# Patient Record
Sex: FEMALE | Race: WHITE | Employment: OTHER | RURAL
[De-identification: names, ages, dates, MRNs, and addresses within clinical notes are randomized per-mention and may not be internally consistent; named-entity substitution may affect disease eponyms.]

---

## 2017-02-02 RX ORDER — EXEMESTANE 25 MG/1
TABLET ORAL
Qty: 30 TAB | Refills: 0 | Status: SHIPPED | OUTPATIENT
Start: 2017-02-02 | End: 2017-03-01 | Stop reason: SDUPTHER

## 2017-02-14 ENCOUNTER — TELEPHONE (OUTPATIENT)
Dept: FAMILY MEDICINE CLINIC | Age: 62
End: 2017-02-14

## 2017-02-14 ENCOUNTER — OFFICE VISIT (OUTPATIENT)
Dept: FAMILY MEDICINE CLINIC | Age: 62
End: 2017-02-14

## 2017-02-14 VITALS
HEIGHT: 66 IN | RESPIRATION RATE: 20 BRPM | OXYGEN SATURATION: 94 % | WEIGHT: 263.6 LBS | SYSTOLIC BLOOD PRESSURE: 114 MMHG | DIASTOLIC BLOOD PRESSURE: 68 MMHG | BODY MASS INDEX: 42.36 KG/M2 | TEMPERATURE: 97.2 F | HEART RATE: 86 BPM

## 2017-02-14 DIAGNOSIS — J01.10 ACUTE FRONTAL SINUSITIS, RECURRENCE NOT SPECIFIED: ICD-10-CM

## 2017-02-14 DIAGNOSIS — J40 BRONCHITIS: ICD-10-CM

## 2017-02-14 RX ORDER — DEXAMETHASONE SODIUM PHOSPHATE 4 MG/ML
10 INJECTION, SOLUTION INTRA-ARTICULAR; INTRALESIONAL; INTRAMUSCULAR; INTRAVENOUS; SOFT TISSUE ONCE
Qty: 2.5 ML | Refills: 0
Start: 2017-02-14 | End: 2017-02-14

## 2017-02-14 RX ORDER — BENZONATATE 200 MG/1
200 CAPSULE ORAL
Qty: 21 CAP | Refills: 3 | Status: SHIPPED | OUTPATIENT
Start: 2017-02-14 | End: 2017-02-21

## 2017-02-14 RX ORDER — CEFTRIAXONE 1 G/1
1 INJECTION, POWDER, FOR SOLUTION INTRAMUSCULAR; INTRAVENOUS ONCE
Qty: 1 VIAL | Refills: 0
Start: 2017-02-14 | End: 2017-02-14

## 2017-02-14 NOTE — MR AVS SNAPSHOT
Visit Information Date & Time Provider Department Dept. Phone Encounter #  
 2/14/2017  1:30 PM Robyn Rosales NP [place] [id number] [facility] [phone] [id number] Your Appointments 3/7/2017  1:00 PM  
Follow Up with Roland Ventura MD  
[facility] [address]) Appt Note: FOLLOWUP  
 [address] [place] [address] [id number] [phone]  
  
   
 [address] [place] [address] Upcoming Health Maintenance Date Due  
 FOOT EXAM Q1 2/12/1965 EYE EXAM RETINAL OR DILATED Q1 2/12/1965 Pneumococcal 19-64 Highest Risk (1 of 3 - PCV13) 2/12/1974 PAP AKA CERVICAL CYTOLOGY 2/12/1976 FOBT Q 1 YEAR AGE 50-75 2/12/2005 ZOSTER VACCINE AGE 60> 2/12/2015 LIPID PANEL Q1 9/24/2016 HEMOGLOBIN A1C Q6M 5/21/2017 MICROALBUMIN Q1 11/21/2017 BREAST CANCER SCRN MAMMOGRAM 4/20/2018 DTaP/Tdap/Td series (2 - Td) 11/21/2026 Allergies as of 2/14/2017  Review Complete On: 2/14/2017 By: Robyn Rosales NP Severity Noted Reaction Type Reactions Ace Inhibitors  06/03/2013    Cough Sulfa (Sulfonamide Antibiotics)  06/04/2013    Rash Current Immunizations  Reviewed on 11/7/2016 Name Date Influenza Vaccine 11/7/2016 Tdap 11/21/2016 Not reviewed this visit You Were Diagnosed With   
  
 Codes Comments Cold    -  Primary ICD-10-CM: [place] ICD-9-CM: 997 Acute frontal sinusitis, recurrence not specified     ICD-10-CM: J01.10 ICD-9-CM: 939.4 Bronchitis     ICD-10-CM: J40 ICD-9-CM: 779 Vitals BP Pulse Temp Resp Height(growth percentile) 114/68 (BP 1 Location: Left arm, BP Patient Position: Sitting) 86 97.2 °F (36.2 °C) (Temporal) 20 5' 6\" (1.676 m) Weight(growth percentile) SpO2 BMI OB Status Smoking Status 263 lb 9.6 oz (119.6 kg) 94% 42.55 kg/m2 Postmenopausal Current Some Day Smoker Vitals History BMI and BSA Data Body Mass Index Body Surface Area 42.55 kg/m 2 2.36 m 2 Preferred Pharmacy Pharmacy Name Phone [address] [phone] Your Updated Medication List  
  
   
This list is accurate as of: 2/14/17  2:52 PM.  Always use your most recent med list.  
  
  
  
  
 atorvastatin 40 mg tablet Commonly known as:  LIPITOR Take 1 Tab by mouth daily. benzonatate 200 mg capsule Commonly known as:  TESSALON Take 1 Cap by mouth three (3) times daily as needed for Cough for up to 7 days. cefTRIAXone 1 gram injection Commonly known as:  ROCEPHIN  
1 g by IntraMUSCular route once for 1 dose. denosumab 60 mg/mL injection Commonly known as:  Hank Armani 60 mg by SubCUTAneous route. dexamethasone 4 mg/mL injection Commonly known as:  DECADRON  
2.5 mL by IntraMUSCular route once for 1 dose. exemestane 25 mg tablet Commonly known as:  AROMASIN  
TAKE ONE TABLET BY MOUTH IN THE MORNING  
  
 FLUoxetine 20 mg capsule Commonly known as:  PROzac TAKE ONE CAPSULE BY MOUTH ONCE DAILY  
  
 glimepiride 4 mg tablet Commonly known as:  AMARYL  
TAKE ONE TABLET BY MOUTH EVERY MORNING  
  
 metFORMIN 1,000 mg tablet Commonly known as:  GLUCOPHAGE  
TAKE ONE TABLET BY MOUTH TWICE DAILY WITH MEALS  
  
 raNITIdine 150 mg tablet Commonly known as:  ZANTAC Take 150 mg by mouth daily. traMADol 50 mg tablet Commonly known as:  ULTRAM  
TAKE ONE TABLET BY MOUTH EVERY 8 HOURS AS NEEDED FOR PAIN **MAX  DAILY  AMOUNT  150  MG**  
  
  
  
  
Prescriptions Sent to Pharmacy Refills  
 benzonatate (TESSALON) 200 mg capsule 3 Sig: Take 1 Cap by mouth three (3) times daily as needed for Cough for up to 7 days. Class: Normal  
 Pharmacy: [address] [place] Ph #: [phone] Route: Oral  
  
We Performed the Following CEFTRIAXONE SODIUM INJECTION  MG [ HCP] Comments:  
 Mix per protocol DEXAMETHASONE SODIUM PHOSPHATE INJECTION 1 MG [ HCP] ID THER/PROPH/DIAG INJECTION, SUBCUT/IM [id number] CPT(R)] ID THER/PROPH/DIAG INJECTION, SUBCUT/IM [id number] CPT(R)] Introducing [facility]! Dear Pati Navarrete: 
Thank you for requesting a Chatwala account. Our records indicate that you have previously registered for a Chatwala account but its currently inactive. Please call our Chatwala support line at [phone]. Additional Information If you have questions, please visit the Frequently Asked Questions section of the Chatwala website at [email or web address] Remember, Chatwala is NOT to be used for urgent needs. For medical emergencies, dial 911. Now available from your iPhone and Android! Please provide this summary of care documentation to your next provider. Your primary care clinician is listed as Joann Smith. If you have any questions after today's visit, please call [phone].

## 2017-02-15 NOTE — PROGRESS NOTES
Subjective:     Savanna Lyons is a 58 y.o. female who presents today with the following:  Chief Complaint   Patient presents with    Cold Symptoms     cough   Savanna Lyons ill x 4 days with headache, nasal congestion cough and rib pain from coughing. Tried steaming, nyquil and Dayquil and using a netti pot. Followed by Dr. Mable Posadas for breast cancer. ROS:  Gen: denies fever, chills, fatigue, weight loss, weight gain  HEENT:denies blurry vision, positive nasal congestion, sore throat  Resp: denies dypsnea, positive cough, wheezing especially at night. CV: denies chest pain radiating to the jaws or arms, palpitations, lower extremity edema  Abd: denies nausea, vomiting, diarrhea, constipation  Neuro: denies numbness/tingling  Endo: denies polyuria, polydipsia, heat/cold intolerance  Heme: no lymphadenopathy    Allergies   Allergen Reactions    Ace Inhibitors Cough    Sulfa (Sulfonamide Antibiotics) Rash         Current Outpatient Prescriptions:     cefTRIAXone (ROCEPHIN) 1 gram injection, 1 g by IntraMUSCular route once for 1 dose., Disp: 1 Vial, Rfl: 0    dexamethasone (DECADRON) 4 mg/mL injection, 2.5 mL by IntraMUSCular route once for 1 dose., Disp: 2.5 mL, Rfl: 0    benzonatate (TESSALON) 200 mg capsule, Take 1 Cap by mouth three (3) times daily as needed for Cough for up to 7 days. , Disp: 21 Cap, Rfl: 3    metFORMIN (GLUCOPHAGE) 1,000 mg tablet, TAKE ONE TABLET BY MOUTH TWICE DAILY WITH MEALS, Disp: 180 Tab, Rfl: 0    exemestane (AROMASIN) 25 mg tablet, TAKE ONE TABLET BY MOUTH IN THE MORNING, Disp: 30 Tab, Rfl: 0    FLUoxetine (PROZAC) 20 mg capsule, TAKE ONE CAPSULE BY MOUTH ONCE DAILY, Disp: 90 Cap, Rfl: 0    glimepiride (AMARYL) 4 mg tablet, TAKE ONE TABLET BY MOUTH EVERY MORNING, Disp: 90 Tab, Rfl: 1    traMADol (ULTRAM) 50 mg tablet, TAKE ONE TABLET BY MOUTH EVERY 8 HOURS AS NEEDED FOR PAIN **MAX  DAILY  AMOUNT  150  MG**, Disp: 50 Tab, Rfl: 2    atorvastatin (LIPITOR) 40 mg tablet, Take 1 Tab by mouth daily. , Disp: 90 Tab, Rfl: 1    denosumab (PROLIA) 60 mg/mL injection, 60 mg by SubCUTAneous route., Disp: , Rfl:     ranitidine (ZANTAC) 150 mg tablet, Take 150 mg by mouth daily. , Disp: , Rfl:     Past Medical History   Diagnosis Date    Anxiety state, unspecified 6/4/2013    Malignant neoplasm of breast (female), unspecified site 6/4/2013    Obesity, unspecified 6/4/2013    Other and unspecified hyperlipidemia 6/4/2013    Type II or unspecified type diabetes mellitus without mention of complication, uncontrolled 6/4/2013       Past Surgical History   Procedure Laterality Date    Hx breast lumpectomy  Feb 2013    Hx breast lumpectomy  March 2013     Revision    Pr chest surgery procedure unlisted  April 2013     Portacath       History   Smoking Status    Current Some Day Smoker    Packs/day: 0.50   Smokeless Tobacco    Not on file       Social History     Social History    Marital status:      Spouse name: N/A    Number of children: N/A    Years of education: N/A     Social History Main Topics    Smoking status: Current Some Day Smoker     Packs/day: 0.50    Smokeless tobacco: None    Alcohol use Yes      Comment: Occasional    Drug use: None    Sexual activity: Not Asked     Other Topics Concern     Service No    Blood Transfusions No    Caffeine Concern No    Occupational Exposure Yes     Works with [employer]     Hobby Hazards Yes    Sleep Concern No    Stress Concern No    Special Diet No     Diabetic    Back Care Yes    Exercise Yes    Seat Belt Yes    Self-Exams Yes     Social History Narrative       Family History   Problem Relation Age of Onset    Heart Failure Mother     Diabetes Father     Heart Disease Father     Lung Disease Maternal Grandmother      TB    Lung Disease Maternal Grandfather      TB    Cancer Sister      Breast    Cancer Other      Breast         Objective:     Visit Vitals    /68 (BP 1 Location: Left arm, BP Patient Position: Sitting)    Pulse 86    Temp 97.2 °F (36.2 °C) (Temporal)    Resp 20    Ht 5' 6\" (1.676 m)    Wt 263 lb 9.6 oz (119.6 kg)    SpO2 94%    BMI 42.55 kg/m2     Body mass index is 42.55 kg/(m^2). General: Alert and oriented. Ill appearing. Well nourished  HEENT : normal cephalic ,atraumatic, frontal tendeness to palpation  Ears:TMs are normal. Canals are clear. Eyes: pupils equal, round, react to light and accommodation. .   Nose:  Boggy and pale mucosa. .   Mouth clear throat erythematous  Neck:supple full range of motion no thyromegaly. Trachea midline, No carotid bruits. No significant lymphadenopathy  Lungs[de-identified] mild expiratory wheezing, clears with cough. no rales, or rhonchi. Heart :RRR, S1 & S2 are normal intensity. No murmur; no gallop  Abdomen: bowel sounds active. No tenderness, guarding, rebound, masses, hepatic or spleen enlargement  Back: no CVA tenderness. Extremities: without clubbing, cyanosis, or edema  Pulses: radial and femoral pulses are normal  Neuro: HMF intact. Cranial nerves II through XII grossly normal.  Motor: is 5 over 5 and symmetrical.   Deep tendon reflexes: +2 equal    Results for orders placed or performed in visit on 11/21/16   HEMOGLOBIN A1C WITH EAG   Result Value Ref Range    Hemoglobin A1c 7.6 (H) 4.8 - 5.6 %    Estimated average glucose 171 mg/dL   CBC WITH AUTOMATED DIFF   Result Value Ref Range    WBC 10.7 3.4 - 10.8 x10E3/uL    RBC 4.57 3.77 - 5.28 x10E6/uL    HGB 12.9 11.1 - 15.9 g/dL    HCT 38.9 34.0 - 46.6 %    MCV 85 79 - 97 fL    MCH 28.2 26.6 - 33.0 pg    MCHC 33.2 31.5 - 35.7 g/dL    RDW 13.8 12.3 - 15.4 %    PLATELET 581 089 - 088 x10E3/uL    NEUTROPHILS 60 %    Lymphocytes 31 %    MONOCYTES 7 %    EOSINOPHILS 2 %    BASOPHILS 0 %    ABS. NEUTROPHILS 6.4 1.4 - 7.0 x10E3/uL    Abs Lymphocytes 3.3 (H) 0.7 - 3.1 x10E3/uL    ABS. MONOCYTES 0.7 0.1 - 0.9 x10E3/uL    ABS. EOSINOPHILS 0.2 0.0 - 0.4 x10E3/uL    ABS.  BASOPHILS 0.0 0.0 - 0.2 x10E3/uL    IMMATURE GRANULOCYTES 0 %    ABS. IMM. GRANS. 0.0 0.0 - 0.1 x10E3/uL   MICROALBUMIN, UR, RAND W/ MICROALBUMIN/CREA RATIO   Result Value Ref Range    Creatinine, urine 92.4 Not Estab. mg/dL    Microalbumin, urine 11.3 Not Estab. ug/mL    Microalb/Creat ratio (ug/mg creat.) 12.2 0.0 - 30.0 mg/g creat   HEPATITIS C AB   Result Value Ref Range    Hep C Virus Ab <0.1 0.0 - 0.9 s/co ratio   MEASLES/MUMPS/RUBELLA IMMUNITY   Result Value Ref Range    Rubella Ab, IgG 23.20 Immune >0.99 index    Rubeola Ab, IgG >300.0 Immune >29.9 AU/mL    Mumps Abs, IgG >300.0 Immune >10.9 AU/mL       No results found for this visit on 02/14/17. Assessment/ Plan:     Marlyn was seen today for cold symptoms. Diagnoses and all orders for this visit:    Cold    Acute frontal sinusitis, recurrence not specified    Bronchitis  -     CEFTRIAXONE SODIUM INJECTION  MG (Qty 4 for 1 gm)  -     THER/PROPH/DIAG INJECTION, SUBCUT/IM  -     DEXAMETHASONE SODIUM PHOSPHATE INJECTION 1 MG (-IBM 10)  -     THER/PROPH/DIAG INJ, SC/IM (74302)    Other orders  -     cefTRIAXone (ROCEPHIN) 1 gram injection; 1 g by IntraMUSCular route once for 1 dose. -     dexamethasone (DECADRON) 4 mg/mL injection; 2.5 mL by IntraMUSCular route once for 1 dose. -     benzonatate (TESSALON) 200 mg capsule; Take 1 Cap by mouth three (3) times daily as needed for Cough for up to 7 days. 1. Cold    2. Acute frontal sinusitis, recurrence not specified    3. Bronchitis        Orders Placed This Encounter    CEFTRIAXONE SODIUM INJECTION  MG (Qty 4 for 1 gm)     Mix per protocol     Order Specific Question:   Charge Quantity?      Answer:   4     Order Specific Question:   Dose     Answer:   1 gram     Order Specific Question:   Site     Answer:   LEFT GLUTEUS     Order Specific Question:   Expiration Date     Answer:   8/31/2019     Order Specific Question:   Lot#     Answer:   [id number]     Order Specific Question:        Answer: [employer]     Order Specific Question:   Perfomed by/Witnessed by: Answer:   djcateter lpn     Order Specific Question:   NDC#     Answer:   7351-6655-00    THER/PROPH/DIAG INJECTION, SUBCUT/IM    DEXAMETHASONE SODIUM PHOSPHATE INJECTION 1 MG (-XWN 10)     Order Specific Question:   Charge Quantity? Answer:   10     Order Specific Question:   Dose     Answer:   4mg/1 ml     Order Specific Question:   Site     Answer:   LEFT GLUTEUS     Order Specific Question:   Expiration Date     Answer:   3/31/2018     Order Specific Question:   Lot#     Answer:   [id number]     Order Specific Question:        Answer:   [employer]     Order Specific Question:   Perfomed by/Witnessed by: Answer:   djforrester lpn     Order Specific Question:   NDC#     Answer:   [address]-11    THER/PROPH/DIAG INJ, SC/IM (82483)    cefTRIAXone (ROCEPHIN) 1 gram injection     Si g by IntraMUSCular route once for 1 dose. Dispense:  1 Vial     Refill:  0    dexamethasone (DECADRON) 4 mg/mL injection     Si.5 mL by IntraMUSCular route once for 1 dose. Dispense:  2.5 mL     Refill:  0    benzonatate (TESSALON) 200 mg capsule     Sig: Take 1 Cap by mouth three (3) times daily as needed for Cough for up to 7 days. Dispense:  21 Cap     Refill:  3         Verbal and written instructions (see AVS) provided.  Patient expresses understanding of diagnosis and treatment plan.     MAYURI Faith

## 2017-02-16 ENCOUNTER — DOCUMENTATION ONLY (OUTPATIENT)
Dept: FAMILY MEDICINE CLINIC | Age: 62
End: 2017-02-16

## 2017-03-01 RX ORDER — EXEMESTANE 25 MG/1
TABLET ORAL
Qty: 30 TAB | Refills: 0 | Status: SHIPPED | OUTPATIENT
Start: 2017-03-01 | End: 2017-03-07 | Stop reason: SDUPTHER

## 2017-03-03 RX ORDER — TRAMADOL HYDROCHLORIDE 50 MG/1
TABLET ORAL
Qty: 50 TAB | Refills: 2 | Status: SHIPPED | OUTPATIENT
Start: 2017-03-03 | End: 2017-06-07 | Stop reason: SDUPTHER

## 2017-03-06 ENCOUNTER — TELEPHONE (OUTPATIENT)
Dept: FAMILY MEDICINE CLINIC | Age: 62
End: 2017-03-06

## 2017-03-06 RX ORDER — ASPIRIN 81 MG/1
81 TABLET ORAL DAILY
Qty: 30 TAB | Refills: 3 | Status: SHIPPED | OUTPATIENT
Start: 2017-03-06 | End: 2019-06-25 | Stop reason: SDUPTHER

## 2017-03-07 ENCOUNTER — OFFICE VISIT (OUTPATIENT)
Dept: ONCOLOGY | Age: 62
End: 2017-03-07

## 2017-03-07 ENCOUNTER — TELEPHONE (OUTPATIENT)
Dept: ONCOLOGY | Age: 62
End: 2017-03-07

## 2017-03-07 VITALS — HEIGHT: 66 IN

## 2017-03-07 DIAGNOSIS — M85.80 OSTEOPENIA: ICD-10-CM

## 2017-03-07 DIAGNOSIS — M25.532 WRIST PAIN, LEFT: ICD-10-CM

## 2017-03-07 DIAGNOSIS — C50.911 MALIGNANT NEOPLASM OF RIGHT FEMALE BREAST, UNSPECIFIED SITE OF BREAST: Primary | ICD-10-CM

## 2017-03-07 DIAGNOSIS — Z79.811 USE OF EXEMESTANE (AROMASIN): ICD-10-CM

## 2017-03-07 RX ORDER — EXEMESTANE 25 MG/1
25 TABLET ORAL DAILY
Qty: 90 TAB | Refills: 3 | Status: SHIPPED | OUTPATIENT
Start: 2017-03-07 | End: 2017-03-29 | Stop reason: SDUPTHER

## 2017-03-07 NOTE — TELEPHONE ENCOUNTER
LM, appointment for wrist with Dr. Mariano Gore at [facility] on 3/21/2017 at 10:50, be there at10:30 to fill out new patient paperwork, please make sure you take your insurance care and ID.

## 2017-03-07 NOTE — PROGRESS NOTES
Daisyleroy Humphrey here for follow up for breast cancer. States she if fine. Had some bronchitis but is being treated for that by EMA Beckwith NP. Ms. Wendy Almaguer has a reminder for a \"due or due soon\" health maintenance. I have asked that she contact her primary care provider for follow-up on this health maintenance.

## 2017-03-08 ENCOUNTER — DOCUMENTATION ONLY (OUTPATIENT)
Dept: FAMILY MEDICINE CLINIC | Age: 62
End: 2017-03-08

## 2017-03-08 NOTE — PROGRESS NOTES
Subjective: The patient is a 40-year-old  female with a history of stage IIA carcinoma of the right breast, being seen for followup. Her last visit here was on 11/27/16.     History of Present Illness: The patient underwent a lumpectomy and sentinel lymph node biopsy in March 2013. The tumor proved to be a stage 2, N0, M0 invasive ductal carcinoma which is poorly differentiated. It was ER positive, RI positive, HER2/carmen negative. Oncotype score was 22 with a risk of recurrence of recurrence of 14%. The patient received 6 cycles of Cytoxan and Taxotere between April and August 2013 followed by radiotherapy to the right breast between September and October 2013. She has done well. She has been on letrozole since November 2013 aiming for 5 years.     The patient does complain of some hot flushes and occasional knee pain but does not have generalized arthralgias.     The patient had a mammogram in February 2015 which was negative. Bone density done on October 31, 2013 showed osteopenia. It will be repeated in October of this year. She has been on the letrozole since October 2013 coming up to 2 years in October of this year aiming for about 5.     On 2/22/16 CBC - white count 8.8, hemoglobin 12.9, hematocrit 39.1, platelets 490,488, with 59 segs, 28 lymphs an 8 monos. CMP today is normal except for blood sugar of 111. The creatinine is 0.61, calcium 9.2, alk phos 98, bilirubin 0.17, albumin is 3.4. CA 27.29 from 10/19/15 was normal at less than 3.5. CA 27-29 from 2/22/16 was 22.     The patient had a bone density study 10/14/15, which showed osteopenia. It is basically unchanged from a study done in October, 2013. Her last mammogram was in April, 2016 and it was negative.     She is currently receiving Prolia every six months.  She is on Letrozole 2.5 mg daily, which was begun February, 2013, aiming for a total of five years.     The patient's last mammogram was on 04/20/16 and this was negative.      Her thyroid function was checked and the TSH was 0.97, which was normal, and the free T4 was low.     Letrozole has been reported as causing fatigue. The Letrozole was switched to Exemestane at the last visit and she is tolerating this quite well. It will be continued through February, 2018 and possibly for 10 years. CA 27-29 today is pending. The blood sugar is 142, creatinine 0.8, calcium 9.2, albumin 3.6, white count 7.7, hemoglobin 12.8, hematocrit 39.7, platelet count is 716,096, 52 segs, 30 lymphs and 13 monocytes. The patient has had pain in her left wrist.  This has been going on for about two months. She is wearing a splint, which she purchased at [employer]. She has not seen a physician concerning this. The possibility of a fracture or of carpal tunnel syndrome should be considered.     Past Medical History: Type 2 diabetes mellitus, elevated lipids, obesity and anxiety.     Past Surgical History: Breast lumpectomy, insertion of Mediport.     Medications: Pravachol, Glucophage, Prozac, Prinivil, Ultram, Exemestane.     Allergies: ACE inhibitors and sulfonamides causing a rash.     Transfusions: None.     Personal/Social History: Does not smoke cigarettes, drinks alcohol occasionally.     Family Medical History: The patient has an aunt with carcinoma of the lung and mother had lung cancer.     Review of Systems: Weight has been stable, no fever or night sweats, denies headache, seizures, eye or ear changes, chronic sputum production, hemoptysis, chest pain, PND, angina. No nausea, vomiting, diarrhea, melena, hematochezia, constipation, change in bowel habits, hematemesis, hematuria, nocturia, dysuria, urinary frequency, urinary hesitance, musculoskeletal aching, temperature preferences, polyuria, polydipsia or easy bruisability. No bleeding, no allergies, no psychiatric issues, no depression.  Chronic fatigue, which is improved off the Letrozole as stated above.     Physical Examination:   GENERAL: Patient was a well developed, well nourished white female in no acute distress. VITALS: Weight 260 pounds, temperature 97.2 degrees, /68, pulse 88 and regular, respiratory rate 20 and regular. HEENT: Head is normocephalic, atraumatic. Pupils equal, round, reactive to light and accommodation. Extraocular muscles were intact. Fundi not visualized. Conjunctivae normal and sclerae nonicteric. Ears, nose and throat were normal. Tongue is papillated. NECK: Supple, thyroid normal, no bruits heard over the carotid arteries. No jugular venous distention demonstrated. Trachea was midline. NODES: No cervical, supraclavicular, axillary or inguinal adenopathy noted today. CHEST: Clear. No rales, rhonchi or wheezes. BS adequate at the bases. HEART: Regular rhythm. No murmurs, rubs, thrills or gallops. BREASTS: On the left there are on masses. Some induration on the surgical site on the right, but no masses are felt within the right breast.  ABDOMEN: Soft. No liver edge, spleen tip, masses or ascites. BS are normal.  RECTAL: Deferred. PELVIC: Deferred. SKIN: Warm and dry. No petechiae. No purpura. EXTREMITIES: Show no edema. There is tenderness over the left wrist.  NEURO: Intact. Plantars are downgoing. Cranial nerves intact. CBLL function is normal.    Laboratory:  Check CBC, CMP, CA 27-29.     Impression:  1. Stage IIA carcinoma of the right breast. S/P 6 cycles of adjuvant Taxotere and Cytoxan chemotherapy plus XRT. 2. Toleratoing Exemestane well. 3. Chronic fatigue secondary to Letrozole. 4. Pain left wrist, etiology to be determined.     Plan:  1. Will obtain xrays of the left wrist and refer the patient to orthopedics. 2. Four month return. 3. 3D mammogram on 04/20/17. 4. Prolia 60 mg sub q. 05/08/17. 5. Her Exemestane five year cory will be February, 2018 and will then decide on how long to continue the treatment. CC:     Frances Marshall M.D.

## 2017-03-29 RX ORDER — EXEMESTANE 25 MG/1
TABLET ORAL
Qty: 30 TAB | Refills: 0 | Status: SHIPPED | OUTPATIENT
Start: 2017-03-29 | End: 2017-05-07 | Stop reason: SDUPTHER

## 2017-05-08 DIAGNOSIS — E78.2 MIXED HYPERLIPIDEMIA: ICD-10-CM

## 2017-05-08 RX ORDER — EXEMESTANE 25 MG/1
TABLET ORAL
Qty: 30 TAB | Refills: 0 | Status: SHIPPED | OUTPATIENT
Start: 2017-05-08 | End: 2017-06-07 | Stop reason: SDUPTHER

## 2017-05-09 RX ORDER — METFORMIN HYDROCHLORIDE 1000 MG/1
TABLET ORAL
Qty: 180 TAB | Refills: 0 | Status: SHIPPED | OUTPATIENT
Start: 2017-05-09 | End: 2017-08-03 | Stop reason: SDUPTHER

## 2017-05-09 RX ORDER — ATORVASTATIN CALCIUM 40 MG/1
40 TABLET, FILM COATED ORAL DAILY
Qty: 90 TAB | Refills: 1 | Status: SHIPPED | OUTPATIENT
Start: 2017-05-09 | End: 2017-12-19 | Stop reason: SDUPTHER

## 2017-06-08 RX ORDER — EXEMESTANE 25 MG/1
TABLET ORAL
Qty: 30 TAB | Refills: 0 | Status: SHIPPED | OUTPATIENT
Start: 2017-06-08 | End: 2017-07-06 | Stop reason: SDUPTHER

## 2017-06-08 RX ORDER — TRAMADOL HYDROCHLORIDE 50 MG/1
TABLET ORAL
Qty: 50 TAB | Refills: 0 | Status: SHIPPED | OUTPATIENT
Start: 2017-06-08 | End: 2017-12-19 | Stop reason: SDUPTHER

## 2017-06-27 DIAGNOSIS — E11.9 TYPE 2 DIABETES MELLITUS WITHOUT COMPLICATION, WITHOUT LONG-TERM CURRENT USE OF INSULIN (HCC): ICD-10-CM

## 2017-06-27 RX ORDER — GLIMEPIRIDE 4 MG/1
TABLET ORAL
Qty: 90 TAB | Refills: 1 | Status: SHIPPED | OUTPATIENT
Start: 2017-06-27 | End: 2018-01-14 | Stop reason: SDUPTHER

## 2017-07-05 DIAGNOSIS — C50.919 MALIGNANT NEOPLASM OF FEMALE BREAST, UNSPECIFIED LATERALITY, UNSPECIFIED SITE OF BREAST: Primary | ICD-10-CM

## 2017-07-06 ENCOUNTER — OFFICE VISIT (OUTPATIENT)
Dept: ONCOLOGY | Age: 62
End: 2017-07-06

## 2017-07-06 VITALS
WEIGHT: 118.4 LBS | DIASTOLIC BLOOD PRESSURE: 67 MMHG | BODY MASS INDEX: 19.11 KG/M2 | TEMPERATURE: 98.1 F | RESPIRATION RATE: 17 BRPM | OXYGEN SATURATION: 96 % | SYSTOLIC BLOOD PRESSURE: 153 MMHG | HEART RATE: 96 BPM

## 2017-07-06 DIAGNOSIS — M85.88 OSTEOPENIA OF SPINE: ICD-10-CM

## 2017-07-06 DIAGNOSIS — Z79.811 USE OF EXEMESTANE (AROMASIN): ICD-10-CM

## 2017-07-06 DIAGNOSIS — M81.0 POST-MENOPAUSAL OSTEOPOROSIS: ICD-10-CM

## 2017-07-06 DIAGNOSIS — C50.911 MALIGNANT NEOPLASM OF RIGHT FEMALE BREAST, UNSPECIFIED SITE OF BREAST: Primary | ICD-10-CM

## 2017-07-06 RX ORDER — EXEMESTANE 25 MG/1
25 TABLET ORAL DAILY
Qty: 90 TAB | Refills: 3 | Status: SHIPPED | OUTPATIENT
Start: 2017-07-06 | End: 2018-07-09 | Stop reason: SDUPTHER

## 2017-07-06 NOTE — MR AVS SNAPSHOT
Visit Information Date & Time Provider Department Dept. Phone Encounter #  
 7/6/2017  2:00 PM Donnie Baires MD [facility] [phone] Follow-up Instructions Return in about 4 months (around 11/16/2017) for office visit and evaluation. Upcoming Health Maintenance Date Due  
 FOOT EXAM Q1 2/12/1965 EYE EXAM RETINAL OR DILATED Q1 2/12/1965 Pneumococcal 19-64 Highest Risk (1 of 3 - PCV13) 2/12/1974 PAP AKA CERVICAL CYTOLOGY 2/12/1976 FOBT Q 1 YEAR AGE 50-75 2/12/2005 ZOSTER VACCINE AGE 60> 2/12/2015 LIPID PANEL Q1 9/24/2016 HEMOGLOBIN A1C Q6M 5/21/2017 INFLUENZA AGE 9 TO ADULT 8/1/2017 MICROALBUMIN Q1 11/21/2017 BREAST CANCER SCRN MAMMOGRAM 4/20/2018 DTaP/Tdap/Td series (2 - Td) 11/21/2026 Allergies as of 7/6/2017  Review Complete On: 7/6/2017 By: Donnie Baires MD  
  
 Severity Noted Reaction Type Reactions Ace Inhibitors  06/03/2013    Cough Sulfa (Sulfonamide Antibiotics)  06/04/2013    Rash Current Immunizations  Reviewed on 3/7/2017 Name Date Influenza Vaccine 11/7/2016 Tdap 11/21/2016 Not reviewed this visit You Were Diagnosed With   
  
 Codes Comments Malignant neoplasm of right female breast, unspecified site of breast ([facility] [address]    -  Primary ICD-10-CM: C50.911 ICD-9-CM: 174.9 Use of exemestane (Aromasin)     ICD-10-CM: H73.399 ICD-9-CM: V07.52 Osteopenia of spine     ICD-10-CM: M85.88 ICD-9-CM: 733.90 Post-menopausal osteoporosis     ICD-10-CM: M81.0 ICD-9-CM: 733.01 Vitals BP Pulse Temp Resp Weight(growth percentile) SpO2  
 153/67 (BP 1 Location: Left arm, BP Patient Position: Sitting) 96 98.1 °F (36.7 °C) 17 118 lb 6.4 oz (53.7 kg) 96% BMI OB Status Smoking Status 19.11 kg/m2 Postmenopausal Current Some Day Smoker BMI and BSA Data  Body Mass Index Body Surface Area  
 19.11 kg/m 2 1.58 m 2  
  
 Preferred Pharmacy Pharmacy Name Phone [facility] [address], [place] - [address] [phone] Your Updated Medication List  
  
   
This list is accurate as of: 7/6/17  2:58 PM.  Always use your most recent med list.  
  
  
  
  
 aspirin delayed-release 81 mg tablet Take 1 Tab by mouth daily. Indications: myocardial infarction prevention  
  
 atorvastatin 40 mg tablet Commonly known as:  LIPITOR Take 1 Tab by mouth daily. denosumab 60 mg/mL injection Commonly known as:  Gerri Hides 60 mg by SubCUTAneous route. exemestane 25 mg tablet Commonly known as:  Jackalyn  Take 1 Tab by mouth daily. FLUoxetine 20 mg capsule Commonly known as:  PROzac TAKE ONE CAPSULE BY MOUTH ONCE DAILY  
  
 glimepiride 4 mg tablet Commonly known as:  AMARYL  
TAKE ONE TABLET BY MOUTH EVERY MORNING  
  
 metFORMIN 1,000 mg tablet Commonly known as:  GLUCOPHAGE  
TAKE ONE TABLET BY MOUTH TWICE DAILY WITH MEALS  
  
 raNITIdine 150 mg tablet Commonly known as:  ZANTAC Take 150 mg by mouth daily. traMADol 50 mg tablet Commonly known as:  ULTRAM  
TAKE ONE TABLET BY MOUTH EVERY 8 HOURS AS NEEDED FOR PAIN **MAX  DAILY  AMOUNT  150  MG**  
  
  
  
  
Prescriptions Sent to Pharmacy Refills  
 exemestane (AROMASIN) 25 mg tablet 3 Sig: Take 1 Tab by mouth daily. Class: Normal  
 [facility] [address] [address] Ph #: [phone] Route: Oral  
  
Follow-up Instructions Return in about 4 months (around 11/16/2017) for office visit and evaluation. To-Do List   
 07/10/2017 Imaging:  TATE 3D AMANDA W MAMMO BI DX INCL CAD   
  
 07/21/2017 Imaging:  DEXA BONE DENSITY STUDY AXIAL   
  
 11/08/2017 2:45 PM  
  [facility] ([phone])  
  
 11/10/2017 2:45 PM  
  Appointment with Angelo Navarro 2 at [facility] ([phone]) [facility] SERVICES! Dear Sherlyn Sosa: 
Thank you for requesting a [employer] account. Our records indicate that you have previously registered for a [employer] account but its currently inactive. Please call our X[employer] support line at [phone]. Additional Information If you have questions, please visit the Frequently Asked Questions section of the [employer] website at [email or web address] Remember, [employer] is NOT to be used for urgent needs. For medical emergencies, dial 911. Now available from your iPhone and Android! Please provide this summary of care documentation to your next provider. Your primary care clinician is listed as Yarely Cerna. If you have any questions after today's visit, please call [phone].

## 2017-07-09 NOTE — PROGRESS NOTES
Subjective: The patient is a 58-year-old  female with a history of stage IIA carcinoma of the right breast, being seen for followup. Her last visit to this office was on 03/07/17.      History of Present Illness: The patient underwent a lumpectomy and sentinel lymph node biopsy in March 2013. The tumor proved to be a stage 2, N0, M0 invasive ductal carcinoma which is poorly differentiated. It was ER positive, ME positive, HER2/carmen negative. Oncotype score was 22 with a risk of recurrence of recurrence of 14%. The patient received 6 cycles of Cytoxan and Taxotere between April and August 2013 followed by radiotherapy to the right breast between September and October 2013. She has done well. She has been on letrozole since November 2013 aiming for 5 years.  Adalberto  The patient does complain of some hot flushes and occasional knee pain but does not have generalized arthralgias.      The patient had a mammogram in February 2015 which was negative. Bone density done on October 31, 2013 showed osteopenia. It will be repeated in October of this year. She has been on the letrozole since October 2013 coming up to 2 years in October of this year aiming for about 5.      On 2/22/16 CBC - white count 8.8, hemoglobin 12.9, hematocrit 39.1, platelets 139,258, with 59 segs, 28 lymphs an 8 monos. CMP today is normal except for blood sugar of 111. The creatinine is 0.61, calcium 9.2, alk phos 98, bilirubin 0.17, albumin is 3.4. CA 27.29 from 10/19/15 was normal at less than 3.5. CA 27-29 from 2/22/16 was 22.      The patient had a bone density study 10/14/15, which showed osteopenia. It is basically unchanged from a study done in October, 2013. Her last mammogram was in April, 2016 and it was negative.      She is currently receiving Prolia every six months.  She was taking Letrozole 2.5 mg daily, which had been begun February, 2013, however the Letrozole was switched to Exemestane in the early part of 2017 and the patient is tolerating this quite well. The plan is to continue it through February of 2018 and possibly for 10 years. Today the CA 27-29 is 8.6, the LDH is 122. CBC - WBC 9.5, HGB 12.6, HCT 38.6, plat 398k, 67 segs, 25 lymphs and 6 monos. The CMP is normal except for BS of 250, cr 0.8, calcium 9.6, globulin 4.1.      The patient's last mammogram was on 04/20/16 and this was negative.         Past Medical History: Type 2 diabetes mellitus, elevated lipids, obesity and anxiety.      Past Surgical History: Breast lumpectomy, insertion of Mediport.      Medications: Pravachol, Glucophage, Prozac, Prinivil, Ultram, Exemestane.      Allergies: ACE inhibitors and sulfonamides causing a rash.      Transfusions: None.      Personal/Social History: Does not smoke cigarettes, drinks alcohol occasionally.      Family Medical History: The patient has an aunt with carcinoma of the lung and mother had lung cancer.      Review of Systems: Weight has been stable, no fever or night sweats, denies headache, seizures, eye or ear changes, chronic sputum production, hemoptysis, chest pain, PND, angina. No nausea, vomiting, diarrhea, melena, hematochezia, constipation, change in bowel habits, hematemesis, hematuria, nocturia, dysuria, urinary frequency, urinary hesitance, musculoskeletal aching, temperature preferences, polyuria, polydipsia or easy bruisability. No bleeding, no allergies, no psychiatric issues, no depression. Chronic fatigue, which is improved off the Letrozole as stated above.      Physical Examination:   GENERAL: Patient was a well developed, well nourished white female in no acute distress. VITALS: /67, P 96 and regular, T 98.1 degrees, R 17 and regular,  lb. HEENT: Head is normocephalic, atraumatic. Pupils equal, round, reactive to light and accommodation. Extraocular muscles were intact. Fundi not visualized. Conjunctivae normal and sclerae nonicteric.  Ears, nose and throat were normal. Tongue is papillated. NECK: Supple, thyroid normal, no bruits heard over the carotid arteries. No jugular venous distention demonstrated. Trachea was midline. NODES: No cervical, supraclavicular, axillary or inguinal adenopathy noted today. CHEST: Clear. No rales, rhonchi or wheezes are heard today. BS adequate at both lung bases. HEART: Regular rhythm. No murmurs, rubs, thrills or gallops are heard today. BREASTS: On the left there are on masses. Some induration on the surgical site on the right, but no masses are noted within the right breast.  ABDOMEN: Soft. No liver edge, spleen tip, masses or ascites. BS are normal.  No tenderness elicited. RECTAL: Deferred. PELVIC: Deferred. SKIN: Warm and dry. Good turgor. No petechiae. No purpura. EXTREMITIES: Show no edema. NEURO: Intact. Plantars are downgoing. Cranial nerves intact. CBLL function is normal.     Laboratory:  See above.      Impression:  1. Stage IIA carcinoma of the right breast. S/P 6 cycles of adjuvant Taxotere and Cytoxan chemotherapy plus XRT. 2. Toleratoing Exemestane well. 3. Chronic fatigue secondary to Letrozole.      Plan:  1. Continue Exemestane 25 mg daily for five or possibly ten years. 2. A 3D mammogram has been scheduled for July of this year. 3. A DEXA scan has been scheduled for July of this year. 4. The DEXA scan that was done in October of 2015 showed osteopenia and the patient continues on Prolia.     CC:     Linda Wheeler NP

## 2017-07-18 ENCOUNTER — DOCUMENTATION ONLY (OUTPATIENT)
Dept: ONCOLOGY | Age: 62
End: 2017-07-18

## 2017-07-18 NOTE — PROGRESS NOTES
Prior Janette Sanchez is not needed for DEXA. Ref number is [id number]. Mary Beth Bell is aware of this.

## 2017-07-25 ENCOUNTER — HOSPITAL ENCOUNTER (OUTPATIENT)
Dept: MAMMOGRAPHY | Age: 62
Discharge: HOME | End: 2017-07-25
Attending: INTERNAL MEDICINE
Payer: MEDICAID

## 2017-07-25 DIAGNOSIS — C50.911 MALIGNANT NEOPLASM OF RIGHT FEMALE BREAST, UNSPECIFIED SITE OF BREAST: ICD-10-CM

## 2017-07-25 DIAGNOSIS — M81.0 POST-MENOPAUSAL OSTEOPOROSIS: ICD-10-CM

## 2017-07-25 DIAGNOSIS — M85.88 OSTEOPENIA OF SPINE: ICD-10-CM

## 2017-07-25 DIAGNOSIS — Z79.811 USE OF EXEMESTANE (AROMASIN): ICD-10-CM

## 2017-07-25 PROCEDURE — 77062 BREAST TOMOSYNTHESIS BI: CPT

## 2017-08-03 RX ORDER — METFORMIN HYDROCHLORIDE 1000 MG/1
TABLET ORAL
Qty: 180 TAB | Refills: 0 | Status: SHIPPED | OUTPATIENT
Start: 2017-08-03 | End: 2017-11-12 | Stop reason: SDUPTHER

## 2017-10-25 RX ORDER — TRAMADOL HYDROCHLORIDE 50 MG/1
TABLET ORAL
Qty: 50 TAB | Refills: 0 | OUTPATIENT
Start: 2017-10-25

## 2017-11-13 RX ORDER — METFORMIN HYDROCHLORIDE 1000 MG/1
TABLET ORAL
Qty: 180 TAB | Refills: 0 | Status: SHIPPED | OUTPATIENT
Start: 2017-11-13 | End: 2017-12-16 | Stop reason: SDUPTHER

## 2017-11-13 RX ORDER — FLUOXETINE HYDROCHLORIDE 20 MG/1
CAPSULE ORAL
Qty: 90 CAP | Refills: 0 | Status: SHIPPED | OUTPATIENT
Start: 2017-11-13 | End: 2017-12-16 | Stop reason: SDUPTHER

## 2017-11-30 ENCOUNTER — TELEPHONE (OUTPATIENT)
Dept: ONCOLOGY | Age: 62
End: 2017-11-30

## 2017-11-30 NOTE — TELEPHONE ENCOUNTER
HIPAA verified by two patient identifiers. Spoke with  Myra Osman and gave him the results that all bones in lumbar spine and hips are stronger, test showed.

## 2017-12-16 RX ORDER — METFORMIN HYDROCHLORIDE 1000 MG/1
TABLET ORAL
Qty: 180 TAB | Refills: 0 | Status: SHIPPED | OUTPATIENT
Start: 2017-12-16 | End: 2018-06-14 | Stop reason: SDUPTHER

## 2017-12-16 RX ORDER — FLUOXETINE HYDROCHLORIDE 20 MG/1
CAPSULE ORAL
Qty: 90 CAP | Refills: 0 | Status: SHIPPED | OUTPATIENT
Start: 2017-12-16 | End: 2018-05-14 | Stop reason: SDUPTHER

## 2017-12-19 ENCOUNTER — OFFICE VISIT (OUTPATIENT)
Dept: FAMILY MEDICINE CLINIC | Age: 62
End: 2017-12-19

## 2017-12-19 VITALS
WEIGHT: 256.6 LBS | OXYGEN SATURATION: 98 % | SYSTOLIC BLOOD PRESSURE: 136 MMHG | TEMPERATURE: 97.3 F | DIASTOLIC BLOOD PRESSURE: 60 MMHG | HEIGHT: 66 IN | BODY MASS INDEX: 41.24 KG/M2 | HEART RATE: 87 BPM | RESPIRATION RATE: 20 BRPM

## 2017-12-19 DIAGNOSIS — E11.9 TYPE 2 DIABETES MELLITUS WITHOUT COMPLICATION, WITHOUT LONG-TERM CURRENT USE OF INSULIN (HCC): Primary | ICD-10-CM

## 2017-12-19 DIAGNOSIS — E78.2 MIXED HYPERLIPIDEMIA: ICD-10-CM

## 2017-12-19 PROBLEM — E66.01 OBESITY, MORBID (HCC): Status: ACTIVE | Noted: 2017-12-19

## 2017-12-19 RX ORDER — TRAMADOL HYDROCHLORIDE 50 MG/1
TABLET ORAL
Qty: 90 TAB | Refills: 0 | Status: SHIPPED | OUTPATIENT
Start: 2017-12-19 | End: 2018-03-11 | Stop reason: SDUPTHER

## 2017-12-19 RX ORDER — ATORVASTATIN CALCIUM 40 MG/1
40 TABLET, FILM COATED ORAL DAILY
Qty: 90 TAB | Refills: 1 | Status: SHIPPED | OUTPATIENT
Start: 2017-12-19 | End: 2018-06-14 | Stop reason: SDUPTHER

## 2017-12-19 NOTE — MR AVS SNAPSHOT
Visit Information Date & Time Provider Department Dept. Phone Encounter #  
 12/19/2017  3:00 PM Gomez Mott NP [address] [phone] [id number] Upcoming Health Maintenance Date Due  
 FOOT EXAM Q1 2/12/1965 EYE EXAM RETINAL OR DILATED Q1 2/12/1965 PAP AKA CERVICAL CYTOLOGY 2/12/1976 FOBT Q 1 YEAR AGE 50-75 2/12/2005 LIPID PANEL Q1 9/24/2016 HEMOGLOBIN A1C Q6M 5/21/2017 MICROALBUMIN Q1 11/21/2017 Pneumococcal 19-64 Highest Risk (2 of 3 - PCV13) 12/19/2018 DTaP/Tdap/Td series (2 - Td) 11/21/2026 Allergies as of 12/19/2017  Review Complete On: 12/19/2017 By: Gomez Mott NP Severity Noted Reaction Type Reactions Ace Inhibitors  06/03/2013    Cough Sulfa (Sulfonamide Antibiotics)  06/04/2013    Rash Current Immunizations  Reviewed on 3/7/2017 Name Date Influenza Vaccine 10/12/2017, 11/7/2016 Tdap 11/21/2016 Not reviewed this visit You Were Diagnosed With   
  
 Codes Comments Type 2 diabetes mellitus without complication, without long-term current use of insulin (HCC)    -  Primary ICD-10-CM: E11.9 ICD-9-CM: 250.00 Mixed hyperlipidemia     ICD-10-CM: E78.2 ICD-9-CM: 272.2 BMI 40.0-44.9, adult [facility])     ICD-10-CM: Z68.41 
ICD-9-CM: V85.41 Vitals BP Pulse Temp Resp Height(growth percentile) 136/60 (BP 1 Location: Left arm, BP Patient Position: Sitting) 87 97.3 °F (36.3 °C) (Temporal) 20 5' 6\" (1.676 m) Weight(growth percentile) SpO2 BMI OB Status Smoking Status 256 lb 9.6 oz (116.4 kg) 98% 41.42 kg/m2 Postmenopausal Current Some Day Smoker BMI and BSA Data Body Mass Index Body Surface Area  
 41.42 kg/m 2 2.33 m 2 Preferred Pharmacy Pharmacy Name Phone [facility] [address], [place] [address] [phone] Your Updated Medication List  
  
   
This list is accurate as of: 12/19/17  4:13 PM.  Always use your most recent med list.  
  
  
  
  
 aspirin delayed-release 81 mg tablet Take 1 Tab by mouth daily. Indications: myocardial infarction prevention  
  
 atorvastatin 40 mg tablet Commonly known as:  LIPITOR Take 1 Tab by mouth daily. denosumab 60 mg/mL injection Commonly known as:  Hank Armani 60 mg by SubCUTAneous route. exemestane 25 mg tablet Commonly known as:  Seldon Sensor Take 1 Tab by mouth daily. FLUoxetine 20 mg capsule Commonly known as:  PROzac TAKE ONE CAPSULE BY MOUTH ONCE DAILY  
  
 glimepiride 4 mg tablet Commonly known as:  AMARYL  
TAKE ONE TABLET BY MOUTH EVERY MORNING  
  
 metFORMIN 1,000 mg tablet Commonly known as:  GLUCOPHAGE  
TAKE ONE TABLET BY MOUTH TWICE DAILY WITH MEALS  
  
 raNITIdine 150 mg tablet Commonly known as:  ZANTAC Take 150 mg by mouth daily. traMADol 50 mg tablet Commonly known as:  ULTRAM  
TAKE ONE TABLET BY MOUTH EVERY 8 HOURS AS NEEDED FOR PAIN **MAX  DAILY  AMOUNT  150  MG**  
  
  
  
  
Prescriptions Printed Refills  
 traMADol (ULTRAM) 50 mg tablet 0 Sig: TAKE ONE TABLET BY MOUTH EVERY 8 HOURS AS NEEDED FOR PAIN **MAX  DAILY  AMOUNT  150  MG**  
 Class: Print Prescriptions Sent to Pharmacy Refills  
 atorvastatin (LIPITOR) 40 mg tablet 1 Sig: Take 1 Tab by mouth daily. Class: Normal  
 Pharmacy: [facility] [address] [address] Ph #: [phone] Route: Oral  
  
We Performed the Following COLLECTION VENOUS BLOOD,VENIPUNCTURE [id number] CPT(R)] HEMOGLOBIN A1C WITH EAG [55737 CPT(R)] LIPID PANEL [19913 CPT(R)] METABOLIC PANEL, COMPREHENSIVE [29650 CPT(R)] MICROALBUMIN, UR, RAND W/ MICROALBUMIN/CREA RATIO [id number] CPT(R)] To-Do List   
 05/10/2018 1:00 PM  
  Appointment with Angelo Navarro 2 at [facility] ([phone]) Introducing [facility] & [facility] SERVICES! Dear Marlyn: 
Thank you for requesting a [employer] account.   Our records indicate that you have previously registered for a [employer] account but its currently inactive. Please call our [employer] support line at [phone]. Additional Information If you have questions, please visit the Frequently Asked Questions section of the [employer] website at [email or web address] Remember, [employer] is NOT to be used for urgent needs. For medical emergencies, dial 911. Now available from your iPhone and Android! Please provide this summary of care documentation to your next provider. Your primary care clinician is listed as Licha Wilian. If you have any questions after today's visit, please call [phone].

## 2017-12-20 LAB
ALBUMIN SERPL-MCNC: 4.4 G/DL (ref 3.6–4.8)
ALBUMIN/CREAT UR: 12.5 MG/G CREAT (ref 0–30)
ALBUMIN/GLOB SERPL: 1.6 {RATIO} (ref 1.2–2.2)
ALP SERPL-CCNC: 91 IU/L (ref 39–117)
ALT SERPL-CCNC: 17 IU/L (ref 0–32)
AST SERPL-CCNC: 13 IU/L (ref 0–40)
BILIRUB SERPL-MCNC: <0.2 MG/DL (ref 0–1.2)
BUN SERPL-MCNC: 12 MG/DL (ref 8–27)
BUN/CREAT SERPL: 21 (ref 12–28)
CALCIUM SERPL-MCNC: 9.8 MG/DL (ref 8.7–10.3)
CHLORIDE SERPL-SCNC: 96 MMOL/L (ref 96–106)
CHOLEST SERPL-MCNC: 251 MG/DL (ref 100–199)
CO2 SERPL-SCNC: 24 MMOL/L (ref 18–29)
CREAT SERPL-MCNC: 0.57 MG/DL (ref 0.57–1)
CREAT UR-MCNC: 55 MG/DL
EST. AVERAGE GLUCOSE BLD GHB EST-MCNC: 169 MG/DL
GLOBULIN SER CALC-MCNC: 2.7 G/DL (ref 1.5–4.5)
GLUCOSE SERPL-MCNC: 95 MG/DL (ref 65–99)
HBA1C MFR BLD: 7.5 % (ref 4.8–5.6)
HDLC SERPL-MCNC: 46 MG/DL
LDLC SERPL CALC-MCNC: 137 MG/DL (ref 0–99)
MICROALBUMIN UR-MCNC: 6.9 UG/ML
POTASSIUM SERPL-SCNC: 4.3 MMOL/L (ref 3.5–5.2)
PROT SERPL-MCNC: 7.1 G/DL (ref 6–8.5)
SODIUM SERPL-SCNC: 140 MMOL/L (ref 134–144)
TRIGL SERPL-MCNC: 338 MG/DL (ref 0–149)
VLDLC SERPL CALC-MCNC: 68 MG/DL (ref 5–40)

## 2017-12-21 NOTE — PROGRESS NOTES
Excellent liver and kidney functions! Some improvement HGBA1C  Down to 7.5 average blood glucose 7.5  Area to work on cholesterol.    A healthy eating plan:  \" Emphasizes vegetables, fruits, whole grains, and fat-free or low-fat dairy products  \" Includes lean meats, poultry, fish, beans, eggs, and nuts  \" Limits saturated and trans fats, sodium, and added sugars  \" Controls portion sizes

## 2017-12-25 NOTE — PROGRESS NOTES
Subjective:     Gabriel Collier is a 58 y.o. female who presents today with the following:  Chief Complaint   Patient presents with    Diabetes    Depression     Teresa Fill presents for f/u for chronic disease management listed below. Increase stress due to 's cancer diagnosis. Diabetes:   Diabetes. Sugars controlled well  Hypoglycemia: none  Tolerating current treatment well  Current medications include diet  oral agent (monotherapy): glimperide    Lab Results   Component Value Date/Time    Hemoglobin A1c 7.5 12/19/2017 03:59 PM    Hemoglobin A1c 7.6 11/21/2016 03:32 PM    Hemoglobin A1c 7.8 09/24/2015 04:48 PM    Glucose 95 12/19/2017 03:59 PM    Microalb/Creat ratio (ug/mg creat.) 12.5 12/19/2017 03:59 PM    LDL, calculated 137 12/19/2017 03:59 PM    Creatinine 0.57 12/19/2017 03:59 PM     Lab Results   Component Value Date/Time    Microalb/Creat ratio (ug/mg creat.) 12.5 12/19/2017 03:59 PM       Last eye exam performed  greather than 1 year ago and was normal.    Last foot exam performed greather than 1 year ago. warm, good capillary refill    Mixed hyperlipidemia: lipid panel today. BMI:Discussed the patient's BMI with her. The BMI follow up plan is as follows:     dietary management education, guidance, and counseling  encourage exercise  monitor weight  prescribed dietary intake    An After Visit Summary was printed and given to the patient.     COMPLIANT WITH MEDICATION:         ROS:  Gen: denies fever, chills, fatigue, weight loss, weight gain  HEENT:denies blurry vision, nasal congestion, sore throat  Resp: denies dypsnea, cough, wheezing  CV: denies chest pain radiating to the jaws or arms, palpitations, lower extremity edema  Abd: denies nausea, vomiting, diarrhea, constipation  Neuro: denies numbness/tingling  Endo: denies polyuria, polydipsia, heat/cold intolerance  Heme: no lymphadenopathy    Allergies   Allergen Reactions    Ace Inhibitors Cough    Sulfa (Sulfonamide Antibiotics) Rash         Current Outpatient Prescriptions:     traMADol (ULTRAM) 50 mg tablet, TAKE ONE TABLET BY MOUTH EVERY 8 HOURS AS NEEDED FOR PAIN **MAX  DAILY  AMOUNT  150  MG**, Disp: 90 Tab, Rfl: 0    atorvastatin (LIPITOR) 40 mg tablet, Take 1 Tab by mouth daily. , Disp: 90 Tab, Rfl: 1    FLUoxetine (PROZAC) 20 mg capsule, TAKE ONE CAPSULE BY MOUTH ONCE DAILY, Disp: 90 Cap, Rfl: 0    metFORMIN (GLUCOPHAGE) 1,000 mg tablet, TAKE ONE TABLET BY MOUTH TWICE DAILY WITH MEALS, Disp: 180 Tab, Rfl: 0    exemestane (AROMASIN) 25 mg tablet, Take 1 Tab by mouth daily. , Disp: 90 Tab, Rfl: 3    glimepiride (AMARYL) 4 mg tablet, TAKE ONE TABLET BY MOUTH EVERY MORNING, Disp: 90 Tab, Rfl: 1    aspirin delayed-release 81 mg tablet, Take 1 Tab by mouth daily. Indications: myocardial infarction prevention, Disp: 30 Tab, Rfl: 3    denosumab (PROLIA) 60 mg/mL injection, 60 mg by SubCUTAneous route., Disp: , Rfl:     ranitidine (ZANTAC) 150 mg tablet, Take 150 mg by mouth daily. , Disp: , Rfl:     Past Medical History:   Diagnosis Date    Anxiety state, unspecified 6/4/2013    Malignant neoplasm of breast (female), unspecified site 6/4/2013    Obesity, unspecified 6/4/2013    Other and unspecified hyperlipidemia 6/4/2013    Radiation therapy complication     Type II or unspecified type diabetes mellitus without mention of complication, uncontrolled 6/4/2013       Past Surgical History:   Procedure Laterality Date    CHEST SURGERY PROCEDURE UNLISTED  April 2013    Portacath    HX BREAST LUMPECTOMY  Feb 2013    HX BREAST LUMPECTOMY  March 2013    Revision       History   Smoking Status    Current Some Day Smoker    Packs/day: 0.50   Smokeless Tobacco    Not on file       Social History     Social History    Marital status:      Spouse name: N/A    Number of children: N/A    Years of education: N/A     Social History Main Topics    Smoking status: Current Some Day Smoker     Packs/day: 0.50    Smokeless tobacco: Not on file    Alcohol use Yes      Comment: Occasional    Drug use: Not on file    Sexual activity: Not on file     Other Topics Concern     Service No    Blood Transfusions No    Caffeine Concern No    Occupational Exposure Yes     Works with [employer].  Hobby Hazards Yes    Sleep Concern No    Stress Concern No    Special Diet No     Diabetic    Back Care Yes    Exercise Yes    Seat Belt Yes    Self-Exams Yes     Social History Narrative       Family History   Problem Relation Age of Onset    Heart Failure Mother     Diabetes Father     Heart Disease Father     Lung Disease Maternal Grandmother      TB    Lung Disease Maternal Grandfather      TB    Cancer Sister      Breast    Cancer Other      Breast         Objective:     Visit Vitals    /60 (BP 1 Location: Left arm, BP Patient Position: Sitting)    Pulse 87    Temp 97.3 °F (36.3 °C) (Temporal)    Resp 20    Ht 5' 6\" (1.676 m)    Wt 256 lb 9.6 oz (116.4 kg)    SpO2 98%    BMI 41.42 kg/m2     Body mass index is 41.42 kg/(m^2). General: Alert and oriented. No acute distress. Well nourished  HEENT :  Ears:TMs are normal. Canals are clear. Eyes: pupils equal, round, react to light and accommodation. Extra ocular movements intact. Nose: patent. Mouth and throat is clear. Neck:supple full range of motion no thyromegaly. Trachea midline, No carotid bruits. No significant lymphadenopathy  Lungs[de-identified] clear to auscultation without wheezes, rales, or rhonchi. Heart :RRR, S1 & S2 are normal intensity. No murmur; no gallop  Abdomen: bowel sounds active. No tenderness, guarding, rebound, masses, hepatic or spleen enlargement  Back: no CVA tenderness. Extremities: without clubbing, cyanosis, or edema  Pulses: radial and femoral pulses are normal  Neuro: HMF intact.  Cranial nerves II through XII grossly normal.  Motor: is 5 over 5 and symmetrical.   Deep tendon reflexes: +2 equal    Results for orders placed or performed in visit on 65/15/59   METABOLIC PANEL, COMPREHENSIVE   Result Value Ref Range    Glucose 95 65 - 99 mg/dL    BUN 12 8 - 27 mg/dL    Creatinine 0.57 0.57 - 1.00 mg/dL    GFR est non- >59 mL/min/1.73    GFR est  >59 mL/min/1.73    BUN/Creatinine ratio 21 12 - 28    Sodium 140 134 - 144 mmol/L    Potassium 4.3 3.5 - 5.2 mmol/L    Chloride 96 96 - 106 mmol/L    CO2 24 18 - 29 mmol/L    Calcium 9.8 8.7 - 10.3 mg/dL    Protein, total 7.1 6.0 - 8.5 g/dL    Albumin 4.4 3.6 - 4.8 g/dL    GLOBULIN, TOTAL 2.7 1.5 - 4.5 g/dL    A-G Ratio 1.6 1.2 - 2.2    Bilirubin, total <0.2 0.0 - 1.2 mg/dL    Alk.  phosphatase 91 39 - 117 IU/L    AST (SGOT) 13 0 - 40 IU/L    ALT (SGPT) 17 0 - 32 IU/L   HEMOGLOBIN A1C WITH EAG   Result Value Ref Range    Hemoglobin A1c 7.5 (H) 4.8 - 5.6 %    Estimated average glucose 169 mg/dL   MICROALBUMIN, UR, RAND W/ MICROALBUMIN/CREA RATIO   Result Value Ref Range    Creatinine, urine 55.0 Not Estab. mg/dL    Microalbumin, urine 6.9 Not Estab. ug/mL    Microalb/Creat ratio (ug/mg creat.) 12.5 0.0 - 30.0 mg/g creat   LIPID PANEL   Result Value Ref Range    Cholesterol, total 251 (H) 100 - 199 mg/dL    Triglyceride 338 (H) 0 - 149 mg/dL    HDL Cholesterol 46 >39 mg/dL    VLDL, calculated 68 (H) 5 - 40 mg/dL    LDL, calculated 137 (H) 0 - 99 mg/dL       Results for orders placed or performed in visit on 42/83/20   METABOLIC PANEL, COMPREHENSIVE   Result Value Ref Range    Glucose 95 65 - 99 mg/dL    BUN 12 8 - 27 mg/dL    Creatinine 0.57 0.57 - 1.00 mg/dL    GFR est non- >59 mL/min/1.73    GFR est  >59 mL/min/1.73    BUN/Creatinine ratio 21 12 - 28    Sodium 140 134 - 144 mmol/L    Potassium 4.3 3.5 - 5.2 mmol/L    Chloride 96 96 - 106 mmol/L    CO2 24 18 - 29 mmol/L    Calcium 9.8 8.7 - 10.3 mg/dL    Protein, total 7.1 6.0 - 8.5 g/dL    Albumin 4.4 3.6 - 4.8 g/dL    GLOBULIN, TOTAL 2.7 1.5 - 4.5 g/dL    A-G Ratio 1.6 1.2 - 2.2    Bilirubin, total <0.2 0.0 - 1.2 mg/dL Alk. phosphatase 91 39 - 117 IU/L    AST (SGOT) 13 0 - 40 IU/L    ALT (SGPT) 17 0 - 32 IU/L    Narrative    Performed at:  [address]  [id number]  : Tali Hardy MD, Phone:  [phone]   HEMOGLOBIN A1C WITH EAG   Result Value Ref Range    Hemoglobin A1c 7.5 (H) 4.8 - 5.6 %    Estimated average glucose 169 mg/dL    Narrative    Performed at:  [address]  [id number]  : Tali Hardy MD, Phone:  [phone]   MICROALBUMIN, UR, RAND W/ MICROALBUMIN/CREA RATIO   Result Value Ref Range    Creatinine, urine 55.0 Not Estab. mg/dL    Microalbumin, urine 6.9 Not Estab. ug/mL    Microalb/Creat ratio (ug/mg creat.) 12.5 0.0 - 30.0 mg/g creat    Narrative    Performed at:  [address]  [id number]  : Tali Hardy MD, Phone:  [phone]   LIPID PANEL   Result Value Ref Range    Cholesterol, total 251 (H) 100 - 199 mg/dL    Triglyceride 338 (H) 0 - 149 mg/dL    HDL Cholesterol 46 >39 mg/dL    VLDL, calculated 68 (H) 5 - 40 mg/dL    LDL, calculated 137 (H) 0 - 99 mg/dL    Narrative    Performed at:  [address]  [id number]  : Tali Hardy MD, Phone:  [phone]       Assessment/ Plan:     Diagnoses and all orders for this visit:    1. Type 2 diabetes mellitus without complication, without long-term current use of insulin (HCC)  -     METABOLIC PANEL, COMPREHENSIVE  -     HEMOGLOBIN A1C WITH EAG  -     COLLECTION VENOUS BLOOD,VENIPUNCTURE  -     MICROALBUMIN, UR, RAND W/ MICROALBUMIN/CREA RATIO  -     LIPID PANEL    2. Mixed hyperlipidemia  -     atorvastatin (LIPITOR) 40 mg tablet;  Take 1 Tab by mouth daily.  -     METABOLIC PANEL, COMPREHENSIVE  -     HEMOGLOBIN A1C WITH EAG  -     COLLECTION VENOUS BLOOD,VENIPUNCTURE  -     MICROALBUMIN, UR, RAND W/ MICROALBUMIN/CREA RATIO  -     LIPID PANEL    3. BMI 40.0-44.9, adult ([facility])    Other orders  -     traMADol (ULTRAM) 50 mg tablet; TAKE ONE TABLET BY MOUTH EVERY 8 HOURS AS NEEDED FOR PAIN **MAX  DAILY  AMOUNT  150  MG**         1. Type 2 diabetes mellitus without complication, without long-term current use of insulin ([address]    2. Mixed hyperlipidemia    3. BMI 40.0-44.9, adult ([address]        Orders Placed This Encounter    COLLECTION VENOUS BLOOD,VENIPUNCTURE    METABOLIC PANEL, COMPREHENSIVE    HEMOGLOBIN A1C WITH EAG    MICROALBUMIN, UR, RAND W/ MICROALBUMIN/CREA RATIO    LIPID PANEL    traMADol (ULTRAM) 50 mg tablet     Sig: TAKE ONE TABLET BY MOUTH EVERY 8 HOURS AS NEEDED FOR PAIN **MAX  DAILY  AMOUNT  150  MG**     Dispense:  90 Tab     Refill:  0    atorvastatin (LIPITOR) 40 mg tablet     Sig: Take 1 Tab by mouth daily. Dispense:  90 Tab     Refill:  1         Verbal and written instructions (see AVS) provided.  Patient expresses understanding of diagnosis and treatment plan. Follow-up Disposition:  Return in about 6 months (around 6/19/2018).       Carroll Bryant, AARON-C

## 2018-01-14 DIAGNOSIS — E11.9 TYPE 2 DIABETES MELLITUS WITHOUT COMPLICATION, WITHOUT LONG-TERM CURRENT USE OF INSULIN (HCC): ICD-10-CM

## 2018-01-16 RX ORDER — GLIMEPIRIDE 4 MG/1
TABLET ORAL
Qty: 90 TAB | Refills: 1 | Status: SHIPPED | OUTPATIENT
Start: 2018-01-16 | End: 2018-07-13 | Stop reason: SDUPTHER

## 2018-05-09 ENCOUNTER — DOCUMENTATION ONLY (OUTPATIENT)
Dept: ONCOLOGY | Age: 63
End: 2018-05-09

## 2018-05-09 NOTE — PROGRESS NOTES
Pt is scheduled for 5/9/2018 for Prolina injection. [facility] notified. Treatment denied by [facility]. Last dose was received on 11/10/2017. Pre authorization department/ person will be following up.

## 2018-05-14 RX ORDER — FLUOXETINE HYDROCHLORIDE 20 MG/1
CAPSULE ORAL
Qty: 90 CAP | Refills: 0 | Status: SHIPPED | OUTPATIENT
Start: 2018-05-14 | End: 2018-10-29 | Stop reason: SDUPTHER

## 2018-07-09 ENCOUNTER — OFFICE VISIT (OUTPATIENT)
Dept: ONCOLOGY | Age: 63
End: 2018-07-09

## 2018-07-09 VITALS
TEMPERATURE: 98.3 F | DIASTOLIC BLOOD PRESSURE: 64 MMHG | BODY MASS INDEX: 39.73 KG/M2 | HEIGHT: 66 IN | RESPIRATION RATE: 16 BRPM | SYSTOLIC BLOOD PRESSURE: 135 MMHG | OXYGEN SATURATION: 96 % | WEIGHT: 247.2 LBS | HEART RATE: 88 BPM

## 2018-07-09 DIAGNOSIS — M85.88 OSTEOPENIA OF SPINE: ICD-10-CM

## 2018-07-09 DIAGNOSIS — Z17.0 MALIGNANT NEOPLASM OF CENTRAL PORTION OF RIGHT BREAST IN FEMALE, ESTROGEN RECEPTOR POSITIVE (HCC): Primary | ICD-10-CM

## 2018-07-09 DIAGNOSIS — C50.111 MALIGNANT NEOPLASM OF CENTRAL PORTION OF RIGHT BREAST IN FEMALE, ESTROGEN RECEPTOR POSITIVE (HCC): Primary | ICD-10-CM

## 2018-07-09 DIAGNOSIS — Z79.811 USE OF EXEMESTANE (AROMASIN): ICD-10-CM

## 2018-07-09 RX ORDER — EXEMESTANE 25 MG/1
25 TABLET ORAL DAILY
Qty: 90 TAB | Refills: 3 | Status: SHIPPED | OUTPATIENT
Start: 2018-07-09 | End: 2019-06-25 | Stop reason: SDUPTHER

## 2018-07-09 NOTE — MR AVS SNAPSHOT
[address] [id number] [phone] Patient: Reza Desai MRN: [id number] PRX:5/62/2622 Visit Information Date & Time Provider Department Dept. Phone Encounter #  
 7/9/2018  2:30 PM Shivani Kaufman MD [facility] [id number] Follow-up Instructions Return in about 4 months (around 11/9/2018) for OV. Follow-up and Disposition History Your Appointments 8/21/2018  4:00 PM  
ESTABLISHED PATIENT with Belkis Browning NP  
[address] ([address]) Appt Note: check up [address] [address]  
[address] [facility] [address] [address] Upcoming Health Maintenance Date Due  
 PAP AKA CERVICAL CYTOLOGY 2/12/1976 FOBT Q 1 YEAR AGE 50-75 2/12/2005 HEMOGLOBIN A1C Q6M 6/19/2018 Influenza Age 5 to Adult 8/1/2018 Pneumococcal 19-64 Highest Risk (2 of 3 - PCV13) 12/19/2018 FOOT EXAM Q1 12/19/2018 MICROALBUMIN Q1 12/19/2018 LIPID PANEL Q1 12/19/2018 EYE EXAM RETINAL OR DILATED Q1 3/5/2019 BREAST CANCER SCRN MAMMOGRAM 7/25/2019 DTaP/Tdap/Td series (2 - Td) 11/21/2026 Allergies as of 7/9/2018  Review Complete On: 7/9/2018 By: Shivani Kaufman MD  
  
 Severity Noted Reaction Type Reactions Ace Inhibitors  06/03/2013    Cough Sulfa (Sulfonamide Antibiotics)  06/04/2013    Rash Current Immunizations  Reviewed on 7/9/2018 Name Date Influenza Vaccine 10/12/2017, 11/7/2016 Tdap 11/21/2016 Reviewed by Jennifer Cali RN on 7/9/2018 at  3:06 PM  
You Were Diagnosed With   
  
 Codes Comments Malignant neoplasm of central portion of right breast in female, estrogen receptor positive ([facility]    -  Primary ICD-10-CM: C50.111, Z17.0 ICD-9-CM: 174.1, V86.0 Osteopenia of spine     ICD-10-CM: M85.88 ICD-9-CM: 733.90   
 Use of exemestane (Aromasin)     ICD-10-CM: A37.014 ICD-9-CM: V07.52 Vitals BP Pulse Temp Resp Height(growth percentile) Weight(growth percentile) 135/64 88 98.3 °F (36.8 °C) (Oral) 16 5' 6\" (1.676 m) 247 lb 3.2 oz (112.1 kg) SpO2 BMI OB Status Smoking Status 96% 39.9 kg/m2 Postmenopausal Current Some Day Smoker BMI and BSA Data Body Mass Index Body Surface Area  
 39.9 kg/m 2 2.28 m 2 Preferred Pharmacy Pharmacy Name Phone [address], [address]ty Mu[address]oz [phone] Your Updated Medication List  
  
   
This list is accurate as of 7/9/18  3:50 PM.  Always use your most recent med list.  
  
  
  
  
 aspirin delayed-release 81 mg tablet Take 1 Tab by mouth daily. Indications: myocardial infarction prevention  
  
 atorvastatin 40 mg tablet Commonly known as:  LIPITOR  
TAKE ONE TABLET BY MOUTH ONCE DAILY CENTRUM SILVER WOMEN PO Take  by mouth. denosumab 60 mg/mL injection Commonly known as:  Trygve Solders 60 mg by SubCUTAneous route. exemestane 25 mg tablet Commonly known as:  Metta Dun Take 1 Tab by mouth daily. FLUoxetine 20 mg capsule Commonly known as:  PROzac TAKE ONE CAPSULE BY MOUTH ONCE DAILY  
  
 glimepiride 4 mg tablet Commonly known as:  AMARYL  
TAKE ONE TABLET BY MOUTH IN THE MORNING  
  
 metFORMIN 1,000 mg tablet Commonly known as:  GLUCOPHAGE  
TAKE ONE TABLET BY MOUTH TWICE DAILY WITH  MEALS  
  
 raNITIdine 150 mg tablet Commonly known as:  ZANTAC Take 150 mg by mouth daily. traMADol 50 mg tablet Commonly known as:  ULTRAM  
TAKE ONE TABLET BY MOUTH EVERY 8 HOURS AS NEEDED FOR PAIN ***MAXIMUM  DAILY  AMOUNT  150MG***  
  
  
  
  
Prescriptions Sent to Pharmacy Refills  
 exemestane (AROMASIN) 25 mg tablet 3 Sig: Take 1 Tab by mouth daily.   
 Class: Normal  
 Pharmacy: [facility] [address] [facility] [address]  #: [phone] Route: Oral  
  
Follow-up Instructions Return in about 4 months (around 11/9/2018) for OV. To-Do List   
 07/24/2018 Imaging:  TATE MAMMOGRAM DIAG BILAT SAME DAY INCL CAD Introducing [facility]! [employer] introduces [employer] patient portal. Now you can access parts of your medical record, email your doctor's office, and request medication refills online. 1. In your internet browser, go to [email or web address] 2. Click on the First Time User? Click Here link in the Sign In box. You will see the New Member Sign Up page. 3. Enter your [employer] Access Code exactly as it appears below. You will not need to use this code after youve completed the sign-up process. If you do not sign up before the expiration date, you must request a new code. · [employer] Access Code: [email or web address] Expires: 10/7/2018  3:17 PM 
 
4. Enter the last four digits of your Social Security Number (xxxx) and Date of Birth (mm/dd/yyyy) as indicated and click Submit. You will be taken to the next sign-up page. 5. Create a [employer] ID. This will be your [employer] login ID and cannot be changed, so think of one that is secure and easy to remember. 6. Create a [employer] password. You can change your password at any time. 7. Enter your Password Reset Question and Answer. This can be used at a later time if you forget your password. 8. Enter your e-mail address. You will receive e-mail notification when new information is available in [address] 9. Click Sign Up. You can now view and download portions of your medical record. 10. Click the Download Summary menu link to download a portable copy of your medical information. If you have questions, please visit the Frequently Asked Questions section of the [employer] website. Remember, [employer] is NOT to be used for urgent needs. For medical emergencies, dial 911. Now available from your iPhone and Android! Please provide this summary of care documentation to your next provider. Your primary care clinician is listed as Edward Chapa. If you have any questions after today's visit, please call [phone].

## 2018-07-09 NOTE — PROGRESS NOTES
Subjective: The patient is a 77-year-old  female with a history of stage IIA carcinoma of the right breast, being seen for followup. Her last visit to this office was one year ago. [facility]  History of Present Illness: The patient underwent a lumpectomy and sentinel lymph node biopsy in 2013. The tumor proved to be a stage 2, N0, M0 invasive ductal carcinoma which is poorly differentiated. It was ER positive, FL positive, HER2/carmen negative. Oncotype score was 22 with a risk of recurrence of recurrence of 14%. The patient received 6 cycles of Cytoxan and Taxotere between April and 2013 followed by radiotherapy to the right breast between September and 2013. She has done well. She has been on letrozole since 2013 aiming for 5 years. but then was switched to exemestane and tolerated it better. She is also on chronic long term lexapro      Mammograms were one year ago. . Bone density has shown osteopenia but repeat in  showed some improvement. Her last mammogram was in .      She received one dose of prolia. . She was taking Letrozole 2.5 mg daily, which had been begun , however the Letrozole was switched to Exemestane in the early part of  and the patient is tolerating this quite well. The plan is to continue it through 2018 and possibly for 10 years. she c/o depression since her  . recently       Past Medical History: Type 2 diabetes mellitus, elevated lipids, obesity and anxiety.      Past Surgical History: Breast lumpectomy, insertion of Mediport.      Medications: Pravachol, Glucophage, Prozac, Prinivil, Ultram, Exemestane.      Allergies: ACE inhibitors and sulfonamides causing a rash.      Transfusions: None.      Personal/Social History: Does not smoke cigarettes, drinks alcohol occasionally.      Family Medical History: The patient has an aunt with carcinoma of the lung and mother had lung cancer.      Review of Systems: Weight has been stable, no fever or night sweats, denies headache, seizures, eye or ear changes, chronic sputum production, hemoptysis, chest pain, PND, angina. No nausea, vomiting, diarrhea, melena, hematochezia, constipation, change in bowel habits, hematemesis, hematuria, nocturia, dysuria, urinary frequency, urinary hesitance, musculoskeletal aching, temperature preferences, polyuria, polydipsia or easy bruisability. No bleeding, no allergies, no psychiatric issues, no depression. Chronic fatigue, which is improved off the Letrozole as stated above.      Physical Examination:   GENERAL: Patient was a well developed, well nourished white female in no acute distress.high BMI  VITALS:see chart  HEENT: Head is normocephalic, atraumatic. Pupils equal, round, reactive to light and accommodation. Extraocular muscles were intact. Fundi not visualized. Conjunctivae normal and sclerae nonicteric. Ears, nose and throat were normal. Tongue is papillated. NECK: Supple, thyroid normal, no bruits heard over the carotid arteries. No jugular venous distention demonstrated. Trachea was midline. NODES: No cervical, supraclavicular, axillary or inguinal adenopathy noted today. CHEST: Clear. No rales, rhonchi or wheezes are heard today. BS adequate at both lung bases. HEART: Regular rhythm. No murmurs, rubs, thrills or gallops are heard today. BREASTS:large pendulous breasts. On the left there are on masses. Some induration on the surgical site on the right, but no masses are noted within the right breast.  ABDOMEN: Soft. No liver edge, spleen tip, masses or ascites. BS are normal.  No tenderness elicited. RECTAL: Deferred. PELVIC: Deferred. SKIN: Warm and dry. Good turgor. No petechiae. No purpura. EXTREMITIES: Show no edema. NEURO: Intact. Plantars are downgoing. Cranial nerves intact. CBLL function is normal.     Laboratory: 11/17 bone dexa showed some improvement in the osteopenia       Impression:  1.  Stage IIA carcinoma of the right breast. ER/MN POSITIVE AND [id number] NEGATIVE WITH INTERMEDIATE ONCOTYPE SCORE. S/P 6 cycles of adjuvant Taxotere and Cytoxan chemotherapy plus XRT(brachytherapy) . no evidence for recurrence locally   2. Toleratoing Exemestane well. 3. Osteopenia ,improved              4. Depression      Plan:  1. Continue hormonal therapy for total of  7- ten years. 2. Mammograms due this month  . 3. Recommend Vit D and adequate dietary calcium,and consider redose prolia .            4.   RECOMMEND PSYCHOLOGY REFERRAL/B ereavment . counseling   CC:     Linda Robertson NP    followup every 4months.

## 2018-07-09 NOTE — PROGRESS NOTES
Marcy Rowe is a 61 y.o. female   Last Mammo was 7/25/17, she is due again this month. She lost her best  in January 2018 and her best friend 6 months before. She feels depressed. Patient interested in attending a grief counseling group or speaking with a counselor. Contacted Rev. Damian Lee at [facility], he will provide a brochure on a local group who meet for grief counseling. Rev. Jimmy Wood came over to provide brochures on local grief counseling group that meets in [place]. He offered to meet with the patient at this time. Patient declined meeting at this time. Encouraged patient to attend meeting.

## 2018-07-13 DIAGNOSIS — M15.9 PRIMARY OSTEOARTHRITIS INVOLVING MULTIPLE JOINTS: ICD-10-CM

## 2018-07-13 DIAGNOSIS — E11.9 TYPE 2 DIABETES MELLITUS WITHOUT COMPLICATION, WITHOUT LONG-TERM CURRENT USE OF INSULIN (HCC): ICD-10-CM

## 2018-07-15 RX ORDER — GLIMEPIRIDE 4 MG/1
TABLET ORAL
Qty: 90 TAB | Refills: 1 | Status: SHIPPED | OUTPATIENT
Start: 2018-07-15 | End: 2019-01-17 | Stop reason: SDUPTHER

## 2018-07-17 RX ORDER — TRAMADOL HYDROCHLORIDE 50 MG/1
TABLET ORAL
Qty: 90 TAB | Refills: 0 | Status: SHIPPED | OUTPATIENT
Start: 2018-07-17 | End: 2018-12-19

## 2018-08-21 ENCOUNTER — OFFICE VISIT (OUTPATIENT)
Dept: FAMILY MEDICINE CLINIC | Age: 63
End: 2018-08-21

## 2018-08-21 VITALS
WEIGHT: 249.8 LBS | DIASTOLIC BLOOD PRESSURE: 70 MMHG | BODY MASS INDEX: 41.62 KG/M2 | TEMPERATURE: 97.1 F | RESPIRATION RATE: 20 BRPM | HEIGHT: 65 IN | SYSTOLIC BLOOD PRESSURE: 134 MMHG | OXYGEN SATURATION: 96 % | HEART RATE: 91 BPM

## 2018-08-21 DIAGNOSIS — E78.2 MIXED HYPERLIPIDEMIA: ICD-10-CM

## 2018-08-21 DIAGNOSIS — M15.9 PRIMARY OSTEOARTHRITIS INVOLVING MULTIPLE JOINTS: ICD-10-CM

## 2018-08-21 DIAGNOSIS — E11.9 TYPE 2 DIABETES MELLITUS WITHOUT COMPLICATION, WITHOUT LONG-TERM CURRENT USE OF INSULIN (HCC): Primary | ICD-10-CM

## 2018-08-21 DIAGNOSIS — Z12.11 SCREENING FOR COLON CANCER: ICD-10-CM

## 2018-08-21 DIAGNOSIS — Z12.39 SCREENING FOR BREAST CANCER: ICD-10-CM

## 2018-08-21 DIAGNOSIS — Z85.3 HISTORY OF BREAST CANCER: ICD-10-CM

## 2018-08-21 RX ORDER — GLUCOSAMINE SULFATE 1500 MG
POWDER IN PACKET (EA) ORAL DAILY
COMMUNITY
End: 2020-08-25

## 2018-08-21 NOTE — MR AVS SNAPSHOT
[address] "[employer]" 62 
[phone] Patient: Hemant Lei MRN: [id number] MHX:[id number] Visit Information Date & Time Provider Department Dept. Phone Encounter #  
 8/21/2018  4:00 PM Rick Andrew NP [facility] [address] [phone] [id number] Upcoming Health Maintenance Date Due  
 PAP AKA CERVICAL CYTOLOGY 2/12/1976 FOBT Q 1 YEAR AGE 50-75 2/12/2005 HEMOGLOBIN A1C Q6M 6/19/2018 Influenza Age 5 to Adult 8/1/2018 Pneumococcal 19-64 Highest Risk (2 of 3 - PCV13) 12/19/2018 FOOT EXAM Q1 12/19/2018 MICROALBUMIN Q1 12/19/2018 LIPID PANEL Q1 12/19/2018 EYE EXAM RETINAL OR DILATED Q1 3/5/2019 BREAST CANCER SCRN MAMMOGRAM 7/25/2019 DTaP/Tdap/Td series (2 - Td) 11/21/2026 Allergies as of 8/21/2018  Review Complete On: 7/9/2018 By: Xena Aguiar MD  
  
 Severity Noted Reaction Type Reactions Ace Inhibitors  06/03/2013    Cough Sulfa (Sulfonamide Antibiotics)  06/04/2013    Rash Current Immunizations  Reviewed on 7/9/2018 Name Date Influenza Vaccine 10/12/2017, 11/7/2016 Tdap 11/21/2016 Not reviewed this visit You Were Diagnosed With   
  
 Codes Comments Screening for colon cancer    -  Primary ICD-10-CM: Z12.11 ICD-9-CM: V76.51 Screening for breast cancer     ICD-10-CM: Z12.31 
ICD-9-CM: V76.10 History of breast cancer     ICD-10-CM: Z85.3 ICD-9-CM: V10.3 Vitals BP Pulse Temp Resp Height(growth percentile) 134/70 (BP 1 Location: Left arm, BP Patient Position: Sitting) 91 97.1 °F (36.2 °C) (Temporal) 20 5' 4.5\" (1.638 m) Weight(growth percentile) SpO2 BMI OB Status Smoking Status 249 lb 12.8 oz (113.3 kg) 96% 42.22 kg/m2 Postmenopausal Current Some Day Smoker BMI and BSA Data Body Mass Index Body Surface Area  
 42.22 kg/m 2 2.27 m 2 Preferred Pharmacy Pharmacy Name Phone [address] [facility] [address] [phone] Your Updated Medication List  
  
   
This list is accurate as of 8/21/18  4:56 PM.  Always use your most recent med list.  
  
  
  
  
 aspirin delayed-release 81 mg tablet Take 1 Tab by mouth daily. Indications: myocardial infarction prevention  
  
 atorvastatin 40 mg tablet Commonly known as:  LIPITOR  
TAKE ONE TABLET BY MOUTH ONCE DAILY CENTRUM SILVER WOMEN PO Take  by mouth.  
  
 exemestane 25 mg tablet Commonly known as:  Wendy Mussel Take 1 Tab by mouth daily. FLUoxetine 20 mg capsule Commonly known as:  PROzac TAKE ONE CAPSULE BY MOUTH ONCE DAILY  
  
 glimepiride 4 mg tablet Commonly known as:  AMARYL  
TAKE ONE TABLET BY MOUTH IN THE MORNING  
  
 metFORMIN 1,000 mg tablet Commonly known as:  GLUCOPHAGE  
TAKE ONE TABLET BY MOUTH TWICE DAILY WITH  MEALS  
  
 raNITIdine 150 mg tablet Commonly known as:  ZANTAC Take 150 mg by mouth daily. traMADol 50 mg tablet Commonly known as:  ULTRAM  
TAKE 1 TABLET BY MOUTH EVERY 8 HOURS AS NEEDED FOR PAIN ***MAXIMUM  DAILY  AMOUNT  150MG***  
  
 VITAMIN D3 1,000 unit Cap Generic drug:  cholecalciferol Take  by mouth daily. We Performed the Following REFERRAL FOR COLONOSCOPY [WVC970 Custom] Comments:  
 Please evaluate patient for colon cancer initial. History of breast cancer 5 years . To-Do List   
 08/21/2018 Imaging:  TATE 3D AMANDA W MAMMO BI DX INCL CAD Referral Information Referral ID Referred By Referred To  
  
 [id number] Bess JACQUES MD   
   [facility] [address] Phone: [phone] Fax: [phone] Visits Status Start Date End Date 1 New Request 8/21/18 8/21/19 If your referral has a status of pending review or denied, additional information will be sent to support the outcome of this decision. Introducing hospitals & HEALTH SERVICES! [employer] introduces [employer] patient portal. Now you can access parts of your medical record, email your doctor's office, and request medication refills online. 1. In your internet browser, go to [email or web address] 2. Click on the First Time User? Click Here link in the Sign In box. You will see the New Member Sign Up page. 3. Enter your [employer] Access Code exactly as it appears below. You will not need to use this code after youve completed the sign-up process. If you do not sign up before the expiration date, you must request a new code. · [employer] Access Code: [email or web address] Expires: 10/7/2018  3:17 PM 
 
4. Enter the last four digits of your Social Security Number (xxxx) and Date of Birth (mm/dd/yyyy) as indicated and click Submit. You will be taken to the next sign-up page. 5. Create a [employer] ID. This will be your [employer] login ID and cannot be changed, so think of one that is secure and easy to remember. 6. Create a [employer] password. You can change your password at any time. 7. Enter your Password Reset Question and Answer. This can be used at a later time if you forget your password. 8. Enter your e-mail address. You will receive e-mail notification when new information is available in [address] 9. Click Sign Up. You can now view and download portions of your medical record. 10. Click the Download Summary menu link to download a portable copy of your medical information. If you have questions, please visit the Frequently Asked Questions section of the [employer] website. Remember, [employer] is NOT to be used for urgent needs. For medical emergencies, dial 911. Now available from your iPhone and Android! Please provide this summary of care documentation to your next provider. Your primary care clinician is listed as Alexei Vicente. If you have any questions after today's visit, please call [phone].

## 2018-08-26 NOTE — PROGRESS NOTES
Subjective:     Mariella Mcintosh is a 61 y.o. female who presents today with the following:  Chief Complaint   Patient presents with    Medication Management   new concern:  passed away recently. Leaving the practice due to insurance . Will provide a 30 day refill rx . Patient Active Problem List   Diagnosis Code    Type 2 diabetes mellitus without complication, without long-term current use of insulin ([facility] [address] E11.9    Malignant neoplasm of central portion of right breast in female, estrogen receptor positive ([facility] [address] C50.111, Z17.0    Mixed hyperlipidemia E78.2    Obesity, unspecified E66.9    Anxiety state, unspecified F41.1    Osteoarthritis M19.90    Use of letrozole (Femara) Z79.811    Osteopenia M85.80    Fatigue due to treatment R53.83    Use of exemestane (Aromasin) Z79.811    Wrist pain, left M25.532    Post-menopausal osteoporosis M81.0    Obesity, morbid (HCC) E66.01         COMPLIANT WITH MEDICATION:    Denies chest pain, dyspnea, palpitations, headache and blurred vision. Blood pressure normotensive. DM:   Diabetes. Sugars controlled moderately  Hypoglycemia: none  Tolerating current treatment moderately  Current medications include diet  oral agents (dual therapy): Glucophage    Lab Results   Component Value Date/Time    Hemoglobin A1c 7.5 (H) 12/19/2017 03:59 PM    Hemoglobin A1c 7.6 (H) 11/21/2016 03:32 PM    Hemoglobin A1c 7.8 (H) 09/24/2015 04:48 PM    Glucose 112 (H) 07/09/2018 04:15 PM    Microalb/Creat ratio (ug/mg creat.) 12.5 12/19/2017 03:59 PM    LDL, calculated 137 (H) 12/19/2017 03:59 PM    Creatinine 0.74 07/09/2018 04:15 PM     Lab Results   Component Value Date/Time    Microalb/Creat ratio (ug/mg creat.) 12.5 12/19/2017 03:59 PM       Last eye exam performed  less than 1 year 3/5/2018 ago and was normal with corrective lenses. .    Last foot exam 12/19/2017 performed less than 1 year ago.   warm, good capillary refill        ROS:  Gen: denies fever, chills, fatigue, weight loss, weight gain  HEENT:denies blurry vision, nasal congestion, sore throat  Resp: denies dypsnea, cough, wheezing  CV: denies chest pain radiating to the jaws or arms, palpitations, lower extremity edema  Abd: denies nausea, vomiting, diarrhea, constipation  Neuro: denies numbness/tingling  Endo: denies polyuria, polydipsia, heat/cold intolerance  Heme: no lymphadenopathy    Allergies   Allergen Reactions    Ace Inhibitors Cough    Sulfa (Sulfonamide Antibiotics) Rash           Past Medical History:   Diagnosis Date    Anxiety state, unspecified 6/4/2013    Malignant neoplasm of breast (female), unspecified site 6/4/2013    Obesity, unspecified 6/4/2013    Other and unspecified hyperlipidemia 6/4/2013    Radiation therapy complication     Type II or unspecified type diabetes mellitus without mention of complication, uncontrolled 6/4/2013       Past Surgical History:   Procedure Laterality Date    CHEST SURGERY PROCEDURE UNLISTED  April 2013    Portacath    HX BREAST LUMPECTOMY  Feb 2013    HX BREAST LUMPECTOMY  March 2013    Revision       History   Smoking Status    Current Some Day Smoker    Packs/day: 0.50    Types: Cigarettes   Smokeless Tobacco    Never Used       Social History     Social History    Marital status:      Spouse name: N/A    Number of children: N/A    Years of education: N/A     Social History Main Topics    Smoking status: Current Some Day Smoker     Packs/day: 0.50     Types: Cigarettes    Smokeless tobacco: Never Used    Alcohol use Yes      Comment: Occasional    Drug use: None    Sexual activity: Not Asked     Other Topics Concern     Service No    Blood Transfusions No    Caffeine Concern No    Occupational Exposure Yes     Works with [employer].     Hobby Hazards Yes    Sleep Concern No    Stress Concern No    Special Diet No     Diabetic    Back Care Yes    Exercise Yes    Seat Belt Yes    Self-Exams Yes     Social History Narrative       Family History   Problem Relation Age of Onset    Heart Failure Mother     Diabetes Father     Heart Disease Father     Lung Disease Maternal Grandmother      TB    Lung Disease Maternal Grandfather      TB    Cancer Sister      Breast    Cancer Other      Breast         Objective:     Visit Vitals    /70 (BP 1 Location: Left arm, BP Patient Position: Sitting)    Pulse 91    Temp 97.1 °F (36.2 °C) (Temporal)    Resp 20    Ht 5' 4.5\" (1.638 m)    Wt 249 lb 12.8 oz (113.3 kg)    SpO2 96%    BMI 42.22 kg/m2     Body mass index is 42.22 kg/(m^2). General: Alert and oriented. No acute distress. Well nourished. Tearful inconsolable at times. HEENT :  Ears:TMs are normal. Canals are clear. Eyes: pupils equal, round, react to light and accommodation. Extra ocular movements intact. Nose: patent. Mouth and throat is clear. Neck:supple full range of motion no thyromegaly. Trachea midline, No carotid bruits. No significant lymphadenopathy  Lungs[de-identified] clear to auscultation without wheezes, rales, or rhonchi. Heart :RRR, S1 & S2 are normal intensity. No murmur; no gallop  Abdomen: bowel sounds active. No tenderness, guarding, rebound, masses, hepatic or spleen enlargement  Back: no CVA tenderness. Extremities: without clubbing, cyanosis, or edema  Pulses: radial and femoral pulses are normal  Neuro: HMF intact.  Cranial nerves II through XII grossly normal.  Motor: is 5 over 5 and symmetrical.   Deep tendon reflexes: +2 equal    Results for orders placed or performed during the hospital encounter of 45/58/71   METABOLIC PANEL, COMPREHENSIVE   Result Value Ref Range    Sodium 140 136 - 145 mmol/L    Potassium 4.2 3.5 - 5.1 mmol/L    Chloride 102 97 - 108 mmol/L    CO2 31 21 - 32 mmol/L    Anion gap 7 5 - 15 mmol/L    Glucose 112 (H) 65 - 100 mg/dL    BUN 13 6 - 20 MG/DL    Creatinine 0.74 0.55 - 1.02 MG/DL    BUN/Creatinine ratio 18 12 - 20      GFR est AA >60 >60 ml/min/1.73m2    GFR est non-AA >60 >60 ml/min/1.73m2    Calcium 9.8 8.5 - 10.1 MG/DL    Bilirubin, total 0.2 0.2 - 1.0 MG/DL    ALT (SGPT) 24 12 - 78 U/L    AST (SGOT) 16 15 - 37 U/L    Alk. phosphatase 114 45 - 117 U/L    Protein, total 7.6 6.4 - 8.2 g/dL    Albumin 3.7 3.5 - 5.0 g/dL    Globulin 3.9 2.0 - 4.0 g/dL    A-G Ratio 0.9 (L) 1.1 - 2.2     CBC WITH AUTOMATED DIFF   Result Value Ref Range    WBC 9.9 3.6 - 11.0 K/uL    RBC 4.67 3.80 - 5.20 M/uL    HGB 13.0 11.5 - 16.0 g/dL    HCT 40.1 35.0 - 47.0 %    MCV 85.9 80.0 - 99.0 FL    MCH 27.8 26.0 - 34.0 PG    MCHC 32.4 30.0 - 36.5 g/dL    RDW 13.0 11.5 - 14.5 %    PLATELET 932 (H) 622 - 400 K/uL    MPV 9.0 8.9 - 12.9 FL    NRBC 0.0 0  WBC    ABSOLUTE NRBC 0.00 0.00 - 0.01 K/uL    NEUTROPHILS 62 32 - 75 %    LYMPHOCYTES 28 12 - 49 %    MONOCYTES 8 5 - 13 %    EOSINOPHILS 2 0 - 7 %    BASOPHILS 0 0 - 1 %    IMMATURE GRANULOCYTES 0 0.0 - 0.5 %    ABS. NEUTROPHILS 6.2 1.8 - 8.0 K/UL    ABS. LYMPHOCYTES 2.7 0.8 - 3.5 K/UL    ABS. MONOCYTES 0.7 0.0 - 1.0 K/UL    ABS. EOSINOPHILS 0.2 0.0 - 0.4 K/UL    ABS. BASOPHILS 0.0 0.0 - 0.1 K/UL    ABS. IMM. GRANS. 0.0 0.00 - 0.04 K/UL    DF AUTOMATED         No results found for this visit on 08/21/18. Assessment/ Plan:     1. Screening for colon cancer    - REFERRAL FOR COLONOSCOPY    2. Screening for breast cancer    - TATE 3D AMANDA W MAMMO BI DX INCL CAD; Future    3. History of breast cancer    - TATE 3D AMANDA W MAMMO BI DX INCL CAD; Future    4. Type 2 diabetes mellitus without complication, without long-term current use of insulin (HCC)  Discussed the patient's BMI with her. The BMI follow up plan is as follows:     dietary management education, guidance, and counseling  encourage exercise  monitor weight  prescribed dietary intake    Continue medical regime    5. Mixed hyperlipidemia  Discussed the patient's BMI with her.   The BMI follow up plan is as follows:     dietary management education, guidance, and counseling  encourage exercise  monitor weight  prescribed dietary intake    An After Visit Summary was printed and given to the patient. 6. Primary osteoarthritis involving multiple joints        Orders Placed This Encounter    TATE 3D AMANDA W MAMMO BI DX INCL CAD     Please schedule after 2 pm     Standing Status:   Future     Standing Expiration Date:   9/21/2019     Scheduling Instructions:      [facility]     Order Specific Question:   Reason for Exam     Answer:   hx breast cancer    REFERRAL FOR COLONOSCOPY     Referral Priority:   Routine     Referral Type:   Consultation     Referral Reason:   Specialty Services Required     Referred to Provider:   Elsy Woods MD    cholecalciferol (VITAMIN D3) 1,000 unit cap     Sig: Take  by mouth daily. Verbal and written instructions (see AVS) provided.  Patient expresses understanding of diagnosis and treatment plan. Health Maintenance Due   Topic Date Due    PAP AKA CERVICAL CYTOLOGY  02/12/1976    FOBT Q 1 YEAR AGE 50-75  02/12/2005    HEMOGLOBIN A1C Q6M  06/19/2018    Influenza Age 9 to Adult  08/01/2018         Follow-up Disposition:  Return in about 3 months (around 11/21/2018).  or as needed      MAYURI Bess

## 2018-09-21 RX ORDER — METFORMIN HYDROCHLORIDE 1000 MG/1
TABLET ORAL
Qty: 180 TAB | Refills: 0 | Status: SHIPPED | OUTPATIENT
Start: 2018-09-21 | End: 2018-12-18 | Stop reason: SDUPTHER

## 2018-11-20 ENCOUNTER — OFFICE VISIT (OUTPATIENT)
Dept: ONCOLOGY | Age: 63
End: 2018-11-20

## 2018-11-20 VITALS
DIASTOLIC BLOOD PRESSURE: 73 MMHG | RESPIRATION RATE: 20 BRPM | OXYGEN SATURATION: 95 % | BODY MASS INDEX: 40.45 KG/M2 | HEART RATE: 96 BPM | HEIGHT: 65 IN | TEMPERATURE: 98.4 F | WEIGHT: 242.8 LBS | SYSTOLIC BLOOD PRESSURE: 157 MMHG

## 2018-11-20 DIAGNOSIS — M85.80 OSTEOPENIA, UNSPECIFIED LOCATION: ICD-10-CM

## 2018-11-20 DIAGNOSIS — C50.311 MALIGNANT NEOPLASM OF LOWER-INNER QUADRANT OF RIGHT BREAST OF FEMALE, ESTROGEN RECEPTOR POSITIVE (HCC): Primary | ICD-10-CM

## 2018-11-20 DIAGNOSIS — Z17.0 MALIGNANT NEOPLASM OF LOWER-INNER QUADRANT OF RIGHT BREAST OF FEMALE, ESTROGEN RECEPTOR POSITIVE (HCC): Primary | ICD-10-CM

## 2018-11-20 NOTE — PROGRESS NOTES
Hematology Oncology Progress Note    Ana Wheeler is a 61 y.o. female with history of stage IIA, T2N0M0 PD invasive ductal carcinoma of the right breast, ER & MA positive and Her 2 negative s/p lumpectomy + SLNB in 03/2013. Her oncotype dx recurrence score was 22 with a recurrence risk of 14%. She received 6 cycles of adjuvant chemotherapy with docetaxel + cytoxan completed as of 08/2013 followed by adjuvant radiation therapy completed as of 10/2013. She was started on Letrozole since 11/2013 and was then switched to exemestane since 01/2017. She had a dexa scan done 11/2017 that showed osteopenia and has received Prolia for 3-4 years, last dose received in 11/2017. Her last screening mammogram was done 7/6/17 and is past due for a repeat now. Chart notes reviewed since last visit. Chief complaints: She complains of mild head congestion due to URTI but denies any worsening fatigue, hotflashes, arthralgias or myalgias, SOB, loss of weight or apetite. Visit Vitals  /73 (BP 1 Location: Left arm, BP Patient Position: Sitting)   Pulse 96   Temp 98.4 °F (36.9 °C) (Oral)   Resp 20   Ht 5' 4.5\" (1.638 m)   Wt 242 lb 12.8 oz (110.1 kg)   SpO2 95%   BMI 41.03 kg/m²     Review of Systems:  Constitutional No fevers, chills, night sweats, excessive fatigue or weight loss. Allergic/Immunologic No recent allergic reactions   Eyes No significant visual difficulties. No diplopia. ENMT No problems with hearing, no sore throat, no sinus drainage. Endocrine No hot flashes or night sweats. No cold intolerance, polyuria, or polydipsia   Hematologic/Lymphatic No easy bruising or bleeding. The patient denies any tender or palpable lymph nodes   Breasts No abnormal masses of breast, nipple discharge or pain. Respiratory No dyspnea on exertion, orthopnea, chest pain, cough or hemoptysis. Cardiovascular No anginal chest pain, irregular heart beat, tachycardia, palpitations or orthopnea.    Gastrointestinal No nausea, vomiting, diarrhea, constipation, cramping, dysphagia, reflux, heartburn, GI bleeding, or early satiety. No change in bowel habits. Genitourinary (M) No hematuria, dysuria, increased frequency, urgency, hesitancy or incontinence. Musculoskeletal No joint pain, swelling or redness. No decreased range of motion. Integumentary No chronic rashes, inflammation, ulcerations, pruritus, petechiae, purpura, ecchymoses, or skin changes. Neurologic No headache, blurred vision, and no areas of focal weakness or numbness. Normal gait. No sensory problems. Psychiatric No insomnia, depression, luli or mood swings. No psychotropic drugs. Physical Examination: ECOG PS 0  Breast exam: normal, no masses or axillary lymphadenopathy appreciated. Constitutional Alert, cooperative, oriented. Mood and affect appropriate. Appears close to chronological age. Well nourished. Well developed. Head Normocephalic; no scars   Eyes Conjunctivae and sclerae are clear and without icterus. Pupils are reactive and equal.   ENMT Sinuses are nontender. No oral exudates, ulcers, masses, thrush or mucositis. Oropharynx clear. Tongue normal.   Neck Supple without masses or thyromegaly. No jugular venous distension. Hematologic/Lymphatic No petechiae or purpura. No tender or palpable lymph nodes in the cervical, supraclavicular, axillary or inguinal area. Respiratory Lungs are clear to auscultation without rhonchi or wheezing. Cardiovascular Regular rate and rhythm of heart without murmurs, gallops or rubs. Chest / Line Site Chest is symmetric with no chest wall deformities. Abdomen Non-tender, non-distended, no masses, ascites or hepatosplenomegaly. Good bowel sounds. No guarding or rebound tenderness. No pulsatile masses. Musculoskeletal No tenderness or swelling, normal range of motion without obvious weakness. Extremities No visible deformities, no cyanosis, clubbing or edema.     Skin No rashes, scars, or lesions suggestive of malignancy. No petechiae, purpura, or ecchymoses. No excoriations. Neurologic No sensory or motor deficits, normal cerebellar function, normal gait, cranial nerves intact. Psychiatric Alert and oriented times three. Coherent speech. Verbalizes understanding of our discussions today. Labs: Pending    Imaging: Evita    Assessment and Plan:     1. Stage IIA invasive ductal carcinoma of right breast, ER & MO positive, Her 2 negative:    - She is s/p lumpectomy + SLND in 03/2013 followed by adjuvant chemotherapy with TC followed by adjuvant radiation completed as of 10/2013. - She was initially started on Letrozole in 11/2013 but switched to Exemestane in 03/2017 and has been tolerating it well. - She has osteopenia and has received Prolia for 3-4 years but according to the patient she missed her last dose in 05/2018because it was not approved by her insurance. - I advised her to start oral calcium 500mg twice daily and continue vitamin D 1000mg once daily.  - Blood work ordered for CBC, CMP & CA 27-29 to be done today, results pending.  - She is due for mammogram, will call and reschedule. - She will continue Exemestane 25mg once daily until 11/2020 (total 7 years). 2. Osteopenia:    - I would recommend to continue Prolia every 6 months for now. - Her last Dexa scan was in 11/2017 that showed osteopenia and patient continues to be on Exemestane until 11/2020.  - She is not due for a repeat Dexa scan until 11/2019. She will return to the clinic in 2 weeks after the screening mammogram to discuss the results following which she will continue 6 monthly follow up visits.

## 2018-11-20 NOTE — PROGRESS NOTES
Vidhya Dumont is a 61 y.o. female denies any pain. Patient has had head congestion for about 7 days.

## 2018-12-10 ENCOUNTER — OFFICE VISIT (OUTPATIENT)
Dept: ONCOLOGY | Age: 63
End: 2018-12-10

## 2018-12-10 VITALS
RESPIRATION RATE: 19 BRPM | TEMPERATURE: 97.6 F | DIASTOLIC BLOOD PRESSURE: 69 MMHG | HEART RATE: 93 BPM | HEIGHT: 65 IN | OXYGEN SATURATION: 98 % | BODY MASS INDEX: 40.65 KG/M2 | SYSTOLIC BLOOD PRESSURE: 152 MMHG | WEIGHT: 244 LBS

## 2018-12-10 DIAGNOSIS — Z17.0 MALIGNANT NEOPLASM OF LOWER-INNER QUADRANT OF RIGHT BREAST OF FEMALE, ESTROGEN RECEPTOR POSITIVE (HCC): Primary | ICD-10-CM

## 2018-12-10 DIAGNOSIS — C50.311 MALIGNANT NEOPLASM OF LOWER-INNER QUADRANT OF RIGHT BREAST OF FEMALE, ESTROGEN RECEPTOR POSITIVE (HCC): Primary | ICD-10-CM

## 2018-12-10 PROBLEM — R77.1 HYPERGLOBULINEMIA: Status: ACTIVE | Noted: 2018-12-10

## 2018-12-10 PROBLEM — D75.839 THROMBOCYTOSIS: Status: ACTIVE | Noted: 2018-12-10

## 2018-12-10 NOTE — PROGRESS NOTES
Hubert Worley is a 61 y.o. female who presents to the office today for the following:  Chief Complaint   Patient presents with    Breast Cancer       Referring Provider:  Angela Raymond NP    HPI The patient returns to go over her mammogram. On 12/1718 her mammogram was ok and there is a suggestion for annual F/U. She also had a breast exam and it was fine by Dr Steffany Simmons 1120/18. However the labs presented a difficulty with changes from the previous one. There was the suggestion of immature granulocytes but this was minimal and a slght increase in monocytes and a border ANC but more concerning is the elevated alkaline phosphatase, drop in albumin and elevation of globulins. All of these were normal in July of this year. The patient said that around that time she was suffering with a sinus infection. Last dexa scan was 11/22/17      CURRENT TREATMENT: Exemestane, prolia    Past Medical History:   Diagnosis Date    Anxiety state, unspecified 6/4/2013    Malignant neoplasm of breast (female), unspecified site 6/4/2013    Menopause     Obesity, unspecified 6/4/2013    Other and unspecified hyperlipidemia 6/4/2013    Radiation therapy complication     Type II or unspecified type diabetes mellitus without mention of complication, uncontrolled 6/4/2013     Past Surgical History:   Procedure Laterality Date    CHEST SURGERY PROCEDURE UNLISTED  April 2013    Portacath    HX BREAST BIOPSY      HX BREAST LUMPECTOMY  Feb 2013    HX BREAST LUMPECTOMY  March 2013    Revision       No flowsheet data found.]          Key Oncology Meds             exemestane (AROMASIN) 25 mg tablet  (Taking) Take 1 Tab by mouth daily.               Allergies   Allergen Reactions    Ace Inhibitors Cough    Sulfa (Sulfonamide Antibiotics) Rash       TRANSFUSIONS: None     Social History     Socioeconomic History    Marital status:      Spouse name: Not on file    Number of children: Not on file    Years of education: Not on file  Highest education level: Not on file   Tobacco Use    Smoking status: Current Some Day Smoker     Packs/day: 0.50     Types: Cigarettes    Smokeless tobacco: Never Used   Substance and Sexual Activity    Alcohol use: Yes     Comment: Occasional   Other Topics Concern     Service No    Blood Transfusions No    Caffeine Concern No    Occupational Exposure Yes     Comment: Works with [email or web address].  Hobby Hazards Yes    Sleep Concern No    Stress Concern No    Special Diet No     Comment: Diabetic    Back Care Yes    Exercise Yes    Seat Belt Yes    Self-Exams Yes   . Family History   Problem Relation Age of Onset    Heart Failure Mother     Diabetes Father     Heart Disease Father     Lung Disease Maternal Grandmother         TB    Lung Disease Maternal Grandfather         TB    Cancer Sister         Breast    Breast Cancer Sister     Cancer Other         Breast       Review of Systems   Constitutional: Negative. HENT: Negative. Eyes: Negative. Respiratory: Negative. Cardiovascular: Negative. Gastrointestinal: Negative. Genitourinary: Negative for dysuria and hematuria. Musculoskeletal: Positive for joint pain. Knee pains for years   Skin: Negative. Neurological: Negative for tingling, sensory change, seizures and loss of consciousness. Endo/Heme/Allergies: Positive for polydipsia. Does not bruise/bleed easily. Physical Exam   Constitutional: She is oriented to person, place, and time and well-developed, well-nourished, and in no distress. No distress. obese   HENT:   Head: Normocephalic and atraumatic. Mouth/Throat: Oropharynx is clear and moist. No oropharyngeal exudate. Eyes: Conjunctivae and EOM are normal. Pupils are equal, round, and reactive to light. Right eye exhibits no discharge. Left eye exhibits no discharge. No scleral icterus. Neck: No tracheal deviation present. No thyromegaly present.    Cardiovascular: Normal rate, regular rhythm and normal heart sounds. Exam reveals no gallop and no friction rub. No murmur heard. Pulmonary/Chest: No stridor. No respiratory distress. She has no wheezes. She has no rales. Abdominal: She exhibits no distension and no mass. There is no hepatosplenomegaly. There is no tenderness. There is no rebound and no guarding. Musculoskeletal: She exhibits no edema, tenderness or deformity. Lymphadenopathy:     She has no cervical adenopathy. She has no axillary adenopathy. Right: No inguinal adenopathy present. Left: No inguinal adenopathy present. Neurological: She is alert and oriented to person, place, and time. No cranial nerve deficit. Skin: Skin is warm. Psychiatric: Mood, memory and affect normal.     Visit Vitals  /69 (BP 1 Location: Left arm, BP Patient Position: Sitting)   Pulse 93   Temp 97.6 °F (36.4 °C)   Resp 19   Ht 5' 4.5\" (1.638 m)   Wt 244 lb (110.7 kg)   SpO2 98%   BMI 41.24 kg/m²         ASSESSMENT:  1. Malignant neoplasm of central portion of right breast in female, estrogen receptor positive (HCC)    2. Elevation of alkaline phosphatase. Concern for liver or bone disease (mets?). Drop in Albumin. Was this sinus infection more serious that was thought? Is the increase in globulins linked to the infection and polyclonal or is there a monoclonal gammopathy perhaps with a drop on normal Ig's related to a respiratory infection. Will check this out and see if she needs an appointment earlier. XR Results (maximum last 3): Results from Abstract encounter on 11/20/15   XR HIP RT AP/LAT MIN 2 V   Results from Abstract encounter on 02/06/14   XR MAMMOGRAM DIAG BILAT       CT Results (maximum last 3): Results from Abstract encounter on 05/22/14   CT CHEST W CONT       MRI Results (maximum last 3): No results found for this or any previous visit. Nuclear Medicine Results (maximum last 3):   Results from Abstract encounter on 02/06/14   NM BONE SCAN LTD   Results from Abstract encounter on 09/13/13   NM WHITE BLOOD CELL IMAGING   NM WHITE BLOOD CELL IMAGING       US Results (maximum last 3): Results from Abstract encounter on 10/17/14   US EXT NONVAS RT COMP   Results from Abstract encounter on 06/02/14   US ABD LTD       TATE Results (most recent):  Results from [facility] encounter on 12/07/18   TATE MAMMOGRAM DIAG BILAT SAME DAY INCL CAD    Narrative EXAM:  TATE MAMMOGRAM DIAG BILAT SAME DAY INCL CAD. INDICATION: History of right-sided breast cancer with lumpectomy performed 2013. COMPARISON: Prior mammograms 7/25/2017 through 1/22/2013. COMPOSITION: There are scattered areas of fibroglandular density. TECHNIQUE: Routine CC and MLO views of each breast and spot magnification canal  caudal and spot magnification mediolateral views of the left breast were  performed with digital technique and interpreted with use of computer-aided  detection. Additionally, tomosynthesis of both breasts in the CC and MLO  projections was performed. FINDINGS: There is a stable and benign appearance to lumpectomy change in the  inferomedial right breast. There is no mass, suspicious calcification,  distortion, skin thickening, or nipple retraction. No significant change from  the prior study. Impression IMPRESSION:  No mammographic evidence of malignancy. BI-RADS Assessment  Category 2: Benign finding. RECOMMENDATION:  Routine screening mammogram in one year in the absence of new  or suspicious clinical findings. VAS/US Results (maximum last 3): No results found for this or any previous visit. PET Results (maximum last 3): No results found for this or any previous visit.     Results for orders placed or performed during the hospital encounter of 97/68/66   METABOLIC PANEL, COMPREHENSIVE   Result Value Ref Range    Sodium 140 136 - 145 mmol/L    Potassium 3.8 3.5 - 5.1 mmol/L    Chloride 101 97 - 108 mmol/L    CO2 27 21 - 32 mmol/L    Anion gap 12 5 - 15 mmol/L    Glucose 119 (H) 65 - 100 mg/dL    BUN 18 6 - 20 MG/DL    Creatinine 0.65 0.55 - 1.02 MG/DL    BUN/Creatinine ratio 28 (H) 12 - 20      GFR est AA >60 >60 ml/min/1.73m2    GFR est non-AA >60 >60 ml/min/1.73m2    Calcium 9.5 8.5 - 10.1 MG/DL    Bilirubin, total 0.1 (L) 0.2 - 1.0 MG/DL    ALT (SGPT) 21 12 - 78 U/L    AST (SGOT) 13 (L) 15 - 37 U/L    Alk. phosphatase 120 (H) 45 - 117 U/L    Protein, total 7.8 6.4 - 8.2 g/dL    Albumin 3.1 (L) 3.5 - 5.0 g/dL    Globulin 4.7 (H) 2.0 - 4.0 g/dL    A-G Ratio 0.7 (L) 1.1 - 2.2     CBC WITH AUTOMATED DIFF   Result Value Ref Range    WBC 11.6 (H) 3.6 - 11.0 K/uL    RBC 4.42 3.80 - 5.20 M/uL    HGB 12.2 11.5 - 16.0 g/dL    HCT 38.0 35.0 - 47.0 %    MCV 86.0 80.0 - 99.0 FL    MCH 27.6 26.0 - 34.0 PG    MCHC 32.1 30.0 - 36.5 g/dL    RDW 12.5 11.5 - 14.5 %    PLATELET 539 (H) 425 - 400 K/uL    MPV 8.5 (L) 8.9 - 12.9 FL    NRBC 0.0 0  WBC    ABSOLUTE NRBC 0.00 0.00 - 0.01 K/uL    NEUTROPHILS 67 32 - 75 %    LYMPHOCYTES 21 12 - 49 %    MONOCYTES 9 5 - 13 %    EOSINOPHILS 2 0 - 7 %    BASOPHILS 0 0 - 1 %    IMMATURE GRANULOCYTES 1 (H) 0.0 - 0.5 %    ABS. NEUTROPHILS 7.8 1.8 - 8.0 K/UL    ABS. LYMPHOCYTES 2.4 0.8 - 3.5 K/UL    ABS. MONOCYTES 1.1 (H) 0.0 - 1.0 K/UL    ABS. EOSINOPHILS 0.2 0.0 - 0.4 K/UL    ABS. BASOPHILS 0.0 0.0 - 0.1 K/UL    ABS. IMM. GRANS. 0.1 (H) 0.00 - 0.04 K/UL    DF AUTOMATED     CA 27.29   Result Value Ref Range    CA 27.29 9.9 0.0 - 38.6 U/mL     No orders of the defined types were placed in this encounter. Plan (Comment): Will check repeat alkaline phosphatase and add GGT. Will get SPEP/GUDELIA to R/O Monoclonal Gammopathy and repeat CBC which looks better but low grade thrombocytosis persists. See no mutation studies but level is not high. Did not note a spleen but she is obese and it could be missed. Splenomegaly could obscure a higher platelet count. Had an ABD.  U/S in the past but I can't see the result, order in 5/14 for the liver and GB      Follow-up Disposition:  Return in about 6 months (around 6/10/2019) for Office visit, Labs.     Kemi Sanchez MD

## 2018-12-18 DIAGNOSIS — E78.2 MIXED HYPERLIPIDEMIA: ICD-10-CM

## 2018-12-18 DIAGNOSIS — M15.9 PRIMARY OSTEOARTHRITIS INVOLVING MULTIPLE JOINTS: ICD-10-CM

## 2018-12-19 RX ORDER — TRAMADOL HYDROCHLORIDE 50 MG/1
TABLET ORAL
Qty: 90 TAB | Refills: 0 | OUTPATIENT
Start: 2018-12-19

## 2018-12-19 RX ORDER — ATORVASTATIN CALCIUM 40 MG/1
TABLET, FILM COATED ORAL
Qty: 90 TAB | Refills: 1 | Status: SHIPPED | OUTPATIENT
Start: 2018-12-19 | End: 2019-06-17 | Stop reason: SDUPTHER

## 2018-12-19 RX ORDER — METFORMIN HYDROCHLORIDE 1000 MG/1
TABLET ORAL
Qty: 180 TAB | Refills: 0 | Status: SHIPPED | OUTPATIENT
Start: 2018-12-19 | End: 2019-03-19 | Stop reason: SDUPTHER

## 2019-01-17 DIAGNOSIS — E11.9 TYPE 2 DIABETES MELLITUS WITHOUT COMPLICATION, WITHOUT LONG-TERM CURRENT USE OF INSULIN (HCC): ICD-10-CM

## 2019-01-17 RX ORDER — GLIMEPIRIDE 4 MG/1
TABLET ORAL
Qty: 90 TAB | Refills: 1 | Status: SHIPPED | OUTPATIENT
Start: 2019-01-17 | End: 2019-06-25 | Stop reason: SDUPTHER

## 2019-02-13 ENCOUNTER — OFFICE VISIT (OUTPATIENT)
Dept: ONCOLOGY | Age: 64
End: 2019-02-13

## 2019-02-13 VITALS
HEIGHT: 65 IN | TEMPERATURE: 98.5 F | HEART RATE: 81 BPM | WEIGHT: 246.2 LBS | BODY MASS INDEX: 41.02 KG/M2 | RESPIRATION RATE: 17 BRPM | DIASTOLIC BLOOD PRESSURE: 67 MMHG | SYSTOLIC BLOOD PRESSURE: 149 MMHG | OXYGEN SATURATION: 84 %

## 2019-02-13 DIAGNOSIS — Z17.0 MALIGNANT NEOPLASM OF CENTRAL PORTION OF RIGHT BREAST IN FEMALE, ESTROGEN RECEPTOR POSITIVE (HCC): Primary | ICD-10-CM

## 2019-02-13 DIAGNOSIS — C50.111 MALIGNANT NEOPLASM OF CENTRAL PORTION OF RIGHT BREAST IN FEMALE, ESTROGEN RECEPTOR POSITIVE (HCC): Primary | ICD-10-CM

## 2019-02-13 NOTE — PROGRESS NOTES
Reason for Visit:  
Adriana Philip is a 59 y.o. female who is seen for follow up of breast cancer Treatment History: Dx: IDC Right breast--T2N0M0--Stage IIA--ER/DC+HER2- Tx: Lumpectomy with SLN 3/2013. Oncotype 22. Subsequent chemotherapy with TC x 6 cycles with transition to AI in 2014--was started on letrozole but is currently taking Exemestane--she is unaware of why the transition. Continues on the exemestane. Dx: Osteopenia Tx: Prolia every 6 months History of Present Illness:  
Back for routine follow up. No changes since last check. She had a work up for MGUS at last check and this was negative. Work up was based on elevated Alk Phos. The Alk Phos has been persistenly elevated. She denies any changes in her health or medications. Past Medical History:  
Diagnosis Date  Anxiety state, unspecified 6/4/2013  Malignant neoplasm of breast (female), unspecified site 6/4/2013  Menopause  Obesity, unspecified 6/4/2013  Other and unspecified hyperlipidemia 6/4/2013  Radiation therapy complication  Type II or unspecified type diabetes mellitus without mention of complication, uncontrolled 6/4/2013 Past Surgical History:  
Procedure Laterality Date  CHEST SURGERY PROCEDURE UNLISTED  April 2013 Portacath  HX BREAST BIOPSY  HX BREAST LUMPECTOMY  Feb 2013  HX BREAST LUMPECTOMY  March 2013 Revision Social History Tobacco Use  Smoking status: Current Some Day Smoker Packs/day: 0.50 Years: 50.00 Pack years: 25.00 Types: Cigarettes  Smokeless tobacco: Never Used Substance Use Topics  Alcohol use: Yes Comment: Occasional  
  
Family History Problem Relation Age of Onset  Heart Failure Mother  Diabetes Father  Heart Disease Father  Lung Disease Maternal Grandmother TB  Lung Disease Maternal Grandfather TB  Cancer Sister Breast  
 Breast Cancer Sister  Cancer Other Breast  
 
Current Outpatient Medications Medication Sig  
 glimepiride (AMARYL) 4 mg tablet TAKE 1 TABLET BY MOUTH IN THE MORNING  
 atorvastatin (LIPITOR) 40 mg tablet TAKE 1 TABLET BY MOUTH ONCE DAILY  metFORMIN (GLUCOPHAGE) 1,000 mg tablet TAKE 1 TABLET BY MOUTH TWICE DAILY WITH MEALS  
 FLUoxetine (PROZAC) 20 mg capsule TAKE 1 CAPSULE BY MOUTH ONCE DAILY  cholecalciferol (VITAMIN D3) 1,000 unit cap Take  by mouth daily.  multivit-min/iron/folic/lutein (CENTRUM SILVER WOMEN PO) Take  by mouth.  exemestane (AROMASIN) 25 mg tablet Take 1 Tab by mouth daily.  aspirin delayed-release 81 mg tablet Take 1 Tab by mouth daily. Indications: myocardial infarction prevention  ranitidine (ZANTAC) 150 mg tablet Take 150 mg by mouth daily. No current facility-administered medications for this visit. Allergies Allergen Reactions  Ace Inhibitors Cough  Sulfa (Sulfonamide Antibiotics) Rash Review of Systems: A complete review of systems was obtained, negative except as described above. Physical Exam:  
 
Visit Vitals /67 Pulse 81 Temp 98.5 °F (36.9 °C) (Oral) Resp 17 Ht 5' 4.5\" (1.638 m) Wt 246 lb 3.2 oz (111.7 kg) SpO2 (!) 84% BMI 41.61 kg/m² ECOG PS: 0 General: No distress Eyes: PERRLA, anicteric sclerae HENT: Atraumatic, OP clear Neck: Supple Lymphatic: No cervical, supraclavicular, or inguinal adenopathy Respiratory: CTAB, normal respiratory effort CV: Normal rate, regular rhythm, no murmurs, no peripheral edema GI: Soft, nontender, nondistended, no masses, no hepatomegaly, no splenomegaly MS: Normal gait and station. Digits without clubbing or cyanosis. Skin: No rashes, ecchymoses, or petechiae. Normal temperature, turgor, and texture. Psych: Alert, oriented, appropriate affect, normal judgment/insight Results:  
 
Lab Results Component Value Date/Time  WBC 7.3 02/13/2019 02:43 PM  
 HGB 13.8 02/13/2019 02:43 PM  
 HCT 42.2 02/13/2019 02:43 PM  
 PLATELET 843 28/31/4599 02:43 PM  
 MCV 85.6 02/13/2019 02:43 PM  
 ABS. NEUTROPHILS 3.7 02/13/2019 02:43 PM  
 
Lab Results Component Value Date/Time Sodium 142 12/10/2018 03:09 PM  
 Potassium 4.2 12/10/2018 03:09 PM  
 Chloride 101 12/10/2018 03:09 PM  
 CO2 26 12/10/2018 03:09 PM  
 Glucose 144 (H) 12/10/2018 03:09 PM  
 BUN 12 12/10/2018 03:09 PM  
 Creatinine 0.75 12/10/2018 03:09 PM  
 GFR est AA >60 12/10/2018 03:09 PM  
 GFR est non-AA >60 12/10/2018 03:09 PM  
 Calcium 9.5 12/10/2018 03:09 PM  
 
Lab Results Component Value Date/Time Bilirubin, total 0.1 (L) 12/10/2018 03:09 PM  
 ALT (SGPT) 27 12/10/2018 03:09 PM  
 AST (SGOT) 16 12/10/2018 03:09 PM  
 Alk. phosphatase 125 (H) 12/10/2018 03:09 PM  
 Protein, total 6.7 12/10/2018 03:09 PM  
 Protein, total 7.1 12/10/2018 03:09 PM  
 Albumin 3.7 12/10/2018 03:09 PM  
 Globulin 3.4 12/10/2018 03:09 PM  
 
 
 
Assessment:  
1) Stage IIA IDC right breast ER/MD+ HER2-, s/p Lumpectomy with SLN, TC x 6, XRT, and ongoing AI 2) Osteopenia 3) Persistently elevated Alk Phos Plan:  
1) Continue Exemestane--10 years--hopefully she is cured. Back in 6 months 2) Continue Prolia--taking calcium and Vitamin D 
3) Bone Scan to rule out bone mets as cause for the persistent elevation.  
 
Signed By: Fred Saleh MD

## 2019-03-01 ENCOUNTER — TELEPHONE (OUTPATIENT)
Dept: ONCOLOGY | Age: 64
End: 2019-03-01

## 2019-03-04 ENCOUNTER — TELEPHONE (OUTPATIENT)
Dept: ONCOLOGY | Age: 64
End: 2019-03-04

## 2019-03-04 NOTE — TELEPHONE ENCOUNTER
Patients has called wanting results Bone Scan results done week. Please call patient @ [phone]. Called patient back, notified her  that Dr Keenan Oglesby said her Bone scan looked fined.

## 2019-03-19 RX ORDER — METFORMIN HYDROCHLORIDE 1000 MG/1
TABLET ORAL
Qty: 180 TAB | Refills: 0 | Status: SHIPPED | OUTPATIENT
Start: 2019-03-19 | End: 2019-06-17 | Stop reason: SDUPTHER

## 2019-03-19 RX ORDER — FLUOXETINE HYDROCHLORIDE 20 MG/1
CAPSULE ORAL
Qty: 90 CAP | Refills: 0 | Status: SHIPPED | OUTPATIENT
Start: 2019-03-19 | End: 2019-06-17 | Stop reason: SDUPTHER

## 2019-06-17 DIAGNOSIS — E78.2 MIXED HYPERLIPIDEMIA: ICD-10-CM

## 2019-06-17 RX ORDER — FLUOXETINE HYDROCHLORIDE 20 MG/1
CAPSULE ORAL
Qty: 90 CAP | Refills: 0 | Status: SHIPPED | OUTPATIENT
Start: 2019-06-17 | End: 2019-06-25 | Stop reason: SDUPTHER

## 2019-06-17 RX ORDER — METFORMIN HYDROCHLORIDE 1000 MG/1
TABLET ORAL
Qty: 180 TAB | Refills: 0 | Status: SHIPPED | OUTPATIENT
Start: 2019-06-17 | End: 2019-06-25 | Stop reason: SDUPTHER

## 2019-06-17 RX ORDER — ATORVASTATIN CALCIUM 40 MG/1
TABLET, FILM COATED ORAL
Qty: 90 TAB | Refills: 1 | Status: SHIPPED | OUTPATIENT
Start: 2019-06-17 | End: 2019-12-22

## 2019-06-17 NOTE — TELEPHONE ENCOUNTER
She is well overdue for a follow up appointment  She was due back 3 moths ago last seen 10 months ago.

## 2019-06-28 PROBLEM — E11.21 TYPE 2 DIABETES WITH NEPHROPATHY (HCC): Status: ACTIVE | Noted: 2019-06-28

## 2019-08-13 NOTE — PROGRESS NOTES
Enmanuel Diego is a 59 y.o. female here for f/u breast cancer, she is taking exemestane daily, and receiving prolia Q6M, her last injection was 6/19/19. Patient had a NM bone scan on 2/27/19, her last DEXA was 11/22/2017 and her last Mammo was 12/7/18. Key Oncology Meds             exemestane (AROMASIN) 25 mg tablet Take 1 Tab by mouth daily. Key Pain Meds     The patient is on no pain meds.

## 2019-08-14 ENCOUNTER — OFFICE VISIT (OUTPATIENT)
Dept: ONCOLOGY | Age: 64
End: 2019-08-14

## 2019-08-14 DIAGNOSIS — C50.111 MALIGNANT NEOPLASM OF CENTRAL PORTION OF RIGHT BREAST IN FEMALE, ESTROGEN RECEPTOR POSITIVE (HCC): ICD-10-CM

## 2019-08-14 DIAGNOSIS — Z17.0 MALIGNANT NEOPLASM OF CENTRAL PORTION OF RIGHT BREAST IN FEMALE, ESTROGEN RECEPTOR POSITIVE (HCC): ICD-10-CM

## 2019-08-14 RX ORDER — EXEMESTANE 25 MG/1
25 TABLET ORAL DAILY
Qty: 90 TAB | Refills: 3 | Status: SHIPPED | OUTPATIENT
Start: 2019-08-14 | End: 2020-08-25 | Stop reason: SDUPTHER

## 2019-08-14 NOTE — PROGRESS NOTES
Reason for Visit:   Jae Morgan is a 59 y.o. female who is seen for follow up breast cancer    Treatment History:   Dx: IDC Right breast--T2N0M0--Stage IIA--ER/ME+HER2-  Tx: Lumpectomy with SLN 3/2013. Oncotype 22. Subsequent chemotherapy with TC x 6 cycles with transition to AI in 2014--was started on letrozole but is currently taking Exemestane--she is unaware of why the transition. Continues on the exemestane. Dx: Osteopenia  Tx: Prolia every 6 months    History of Present Illness:   Doing well, no major issues, tripped in lobby--shoe caught floor--this is not common. Past Medical History:   Diagnosis Date    Anxiety state, unspecified 6/4/2013    Malignant neoplasm of breast (female), unspecified site 6/4/2013    Menopause     Obesity, unspecified 6/4/2013    Other and unspecified hyperlipidemia 6/4/2013    Radiation therapy complication     Type II or unspecified type diabetes mellitus without mention of complication, uncontrolled 6/4/2013      Past Surgical History:   Procedure Laterality Date    CHEST SURGERY PROCEDURE UNLISTED  April 2013    Portacath    HX BREAST BIOPSY      HX BREAST LUMPECTOMY  Feb 2013    HX BREAST LUMPECTOMY  March 2013    Revision      Social History     Tobacco Use    Smoking status: Current Some Day Smoker     Packs/day: 0.50     Years: 50.00     Pack years: 25.00     Types: Cigarettes    Smokeless tobacco: Never Used   Substance Use Topics    Alcohol use: Yes     Comment: Occasional      Family History   Problem Relation Age of Onset    Heart Failure Mother     Diabetes Father     Heart Disease Father     Lung Disease Maternal Grandmother         TB    Lung Disease Maternal Grandfather         TB    Cancer Sister         Breast    Breast Cancer Sister     Cancer Other         Breast     Current Outpatient Medications   Medication Sig    denosumab (PROLIA) 60 mg/mL injection 60 mg by SubCUTAneous route.  g 6 months    aspirin delayed-release 81 mg tablet Take 1 Tab by mouth daily. Indications: treatment to prevent a heart attack    FLUoxetine (PROZAC) 20 mg capsule TAKE 1 CAPSULE BY MOUTH ONCE DAILY    metFORMIN (GLUCOPHAGE) 1,000 mg tablet TAKE 1 TABLET BY MOUTH TWICE DAILY WITH MEALS    glimepiride (AMARYL) 4 mg tablet TAKE 1 TABLET BY MOUTH IN THE MORNING    exemestane (AROMASIN) 25 mg tablet Take 1 Tab by mouth daily.  atorvastatin (LIPITOR) 40 mg tablet TAKE 1 TABLET BY MOUTH ONCE DAILY    multivit-min/iron/folic/lutein (CENTRUM SILVER WOMEN PO) Take  by mouth.  ranitidine (ZANTAC) 150 mg tablet Take 150 mg by mouth daily.  cholecalciferol (VITAMIN D3) 1,000 unit cap Take  by mouth daily. No current facility-administered medications for this visit. Allergies   Allergen Reactions    Ace Inhibitors Cough    Sulfa (Sulfonamide Antibiotics) Rash        Review of Systems: A complete review of systems was obtained, negative except as described above. Physical Exam:   There were no vitals taken for this visit. ECOG PS: 0  General: No distress  Eyes: PERRLA, anicteric sclerae  HENT: Atraumatic, OP clear  Neck: Supple  Lymphatic: No cervical, supraclavicular, or inguinal adenopathy  Respiratory: CTAB, normal respiratory effort  Breast: deferred today  CV: Normal rate, regular rhythm, no murmurs, no peripheral edema  GI: Soft, nontender, nondistended, no masses, no hepatomegaly, no splenomegaly  MS: Normal gait and station. Digits without clubbing or cyanosis. Skin: No rashes, ecchymoses, or petechiae. Normal temperature, turgor, and texture. Psych: Alert, oriented, appropriate affect, normal judgment/insight    Results:     Lab Results   Component Value Date/Time    WBC 10.6 06/25/2019 03:25 PM    HGB 12.5 06/25/2019 03:25 PM    HCT 39.2 06/25/2019 03:25 PM    PLATELET 567 84/16/5132 03:25 PM    MCV 89 06/25/2019 03:25 PM    ABS.  NEUTROPHILS 6.8 06/25/2019 03:25 PM     Lab Results   Component Value Date/Time    Sodium 138 06/25/2019 03:25 PM    Potassium 4.5 06/25/2019 03:25 PM    Chloride 100 06/25/2019 03:25 PM    CO2 24 06/25/2019 03:25 PM    Glucose 243 (H) 06/25/2019 03:25 PM    BUN 13 06/25/2019 03:25 PM    Creatinine 0.51 (L) 06/25/2019 03:25 PM    GFR est  06/25/2019 03:25 PM    GFR est non- 06/25/2019 03:25 PM    Calcium 9.4 06/25/2019 03:25 PM     Lab Results   Component Value Date/Time    Bilirubin, total <0.2 06/25/2019 03:25 PM    ALT (SGPT) 25 06/25/2019 03:25 PM    AST (SGOT) 17 06/25/2019 03:25 PM    Alk. phosphatase 94 06/25/2019 03:25 PM    Protein, total 6.8 06/25/2019 03:25 PM    Albumin 4.3 06/25/2019 03:25 PM    Globulin 3.9 02/13/2019 02:43 PM     Bone Scan 2/27/2019  IMPRESSION: No definite evidence of bony metastatic disease. Activity at L5 is  most likely degenerative but correlation with x-rays would be helpful. .    Assessment:   1) Stage IIA IDC right breast ER/SC+ HER2-, s/p Lumpectomy with SLN, TC x 6, XRT, and ongoing AI  2) Osteopenia  3) Persistently elevated Alk Phos        Plan:   1) Continue Exemestane--10 years--hopefully she is cured.   Back in 6 months  2) Continue Prolia--taking calcium and Vitamin D  3) Bone Scan in Feb did not show active areas      Signed By: Abby Lopez MD

## 2019-12-22 DIAGNOSIS — E78.2 MIXED HYPERLIPIDEMIA: ICD-10-CM

## 2019-12-22 RX ORDER — ATORVASTATIN CALCIUM 40 MG/1
TABLET, FILM COATED ORAL
Qty: 90 TAB | Refills: 0 | Status: SHIPPED | OUTPATIENT
Start: 2019-12-22 | End: 2020-03-26

## 2020-07-15 ENCOUNTER — OFFICE VISIT (OUTPATIENT)
Dept: ONCOLOGY | Age: 65
End: 2020-07-15

## 2020-07-15 VITALS
TEMPERATURE: 97 F | WEIGHT: 239 LBS | HEIGHT: 66 IN | BODY MASS INDEX: 38.41 KG/M2 | SYSTOLIC BLOOD PRESSURE: 137 MMHG | HEART RATE: 86 BPM | RESPIRATION RATE: 18 BRPM | DIASTOLIC BLOOD PRESSURE: 79 MMHG | OXYGEN SATURATION: 96 %

## 2020-07-15 DIAGNOSIS — M85.80 DRUG-INDUCED OSTEOPENIA: ICD-10-CM

## 2020-07-15 DIAGNOSIS — T50.905A DRUG-INDUCED OSTEOPENIA: ICD-10-CM

## 2020-07-15 DIAGNOSIS — Z12.31 ENCOUNTER FOR SCREENING MAMMOGRAM FOR MALIGNANT NEOPLASM OF BREAST: ICD-10-CM

## 2020-07-15 DIAGNOSIS — C50.919 MALIGNANT NEOPLASM OF BREAST IN FEMALE, ESTROGEN RECEPTOR POSITIVE, UNSPECIFIED LATERALITY, UNSPECIFIED SITE OF BREAST (HCC): Primary | ICD-10-CM

## 2020-07-15 DIAGNOSIS — Z79.811 LONG TERM (CURRENT) USE OF AROMATASE INHIBITORS: ICD-10-CM

## 2020-07-15 DIAGNOSIS — Z17.0 MALIGNANT NEOPLASM OF BREAST IN FEMALE, ESTROGEN RECEPTOR POSITIVE, UNSPECIFIED LATERALITY, UNSPECIFIED SITE OF BREAST (HCC): Primary | ICD-10-CM

## 2020-07-15 NOTE — PROGRESS NOTES
Reason for Visit:   Gaudencio Manzo is a 72 y.o. female who is seen for follow up breast cancer     Treatment History:   Dx: IDC Right breast--T2N0M0--Stage IIA--ER/PA+HER2-  Tx: Lumpectomy with SLN 3/2013.  Oncotype 22. Subsequent chemotherapy with TC x 6 cycles with transition to AI in 2014--was started on letrozole but is currently taking Exemestane--she is unaware of why the transition.  Continues on the exemestane. Dx: Osteopenia  Tx: Prolia every 6 months    History of Present Illness:   Very Stable, no changes overall, doing self exams and no suspicious areas.     Past Medical History:   Diagnosis Date    Anxiety state, unspecified 6/4/2013    Malignant neoplasm of breast (female), unspecified site 6/4/2013    Menopause     Obesity, unspecified 6/4/2013    Other and unspecified hyperlipidemia 6/4/2013    Radiation therapy complication     Type II or unspecified type diabetes mellitus without mention of complication, uncontrolled 6/4/2013      Past Surgical History:   Procedure Laterality Date    CHEST SURGERY PROCEDURE UNLISTED  April 2013    Portacath    HX BREAST BIOPSY      HX BREAST LUMPECTOMY  Feb 2013    HX BREAST LUMPECTOMY  March 2013    Revision      Social History     Tobacco Use    Smoking status: Current Some Day Smoker     Packs/day: 0.50     Years: 50.00     Pack years: 25.00     Types: Cigarettes    Smokeless tobacco: Never Used   Substance Use Topics    Alcohol use: Yes     Comment: Occasional      Family History   Problem Relation Age of Onset    Heart Failure Mother     Diabetes Father     Heart Disease Father     Lung Disease Maternal Grandmother         TB    Lung Disease Maternal Grandfather         TB    Cancer Sister         Breast    Breast Cancer Sister     Cancer Other         Breast     Current Outpatient Medications   Medication Sig    glimepiride (AMARYL) 4 mg tablet TAKE 1 TABLET BY MOUTH IN THE MORNING    atorvastatin (LIPITOR) 40 mg tablet Take 1 tablet by mouth once daily    metFORMIN (GLUCOPHAGE) 1,000 mg tablet TAKE 1 TABLET BY MOUTH TWICE DAILY WITH MEALS    FLUoxetine (PROzac) 20 mg capsule Take 1 capsule by mouth once daily    exemestane (AROMASIN) 25 mg tablet Take 1 Tab by mouth daily.  denosumab (PROLIA) 60 mg/mL injection 60 mg by SubCUTAneous route. g 6 months    aspirin delayed-release 81 mg tablet Take 1 Tab by mouth daily. Indications: treatment to prevent a heart attack    cholecalciferol (VITAMIN D3) 1,000 unit cap Take  by mouth daily. Not a current medication    multivit-min/iron/folic/lutein (CENTRUM SILVER WOMEN PO) Take  by mouth.  ranitidine (ZANTAC) 150 mg tablet Take 150 mg by mouth daily. No current facility-administered medications for this visit. Allergies   Allergen Reactions    Ace Inhibitors Cough    Sulfa (Sulfonamide Antibiotics) Rash        Review of Systems: A complete review of systems was obtained, negative except as described above. Physical Exam:   There were no vitals taken for this visit. ECOG PS: 1  General: No distress  Eyes: PERRLA, anicteric sclerae  HENT: Atraumatic, OP clear  Neck: Supple  Lymphatic: No cervical, supraclavicular, or inguinal adenopathy  Respiratory: CTAB, normal respiratory effort  Breast: Right lumpectomy and radiation changes--no suspicious areas otherwise. CV: Normal rate, regular rhythm, no murmurs, no peripheral edema  GI: Soft, nontender, nondistended, no masses, no hepatomegaly, no splenomegaly  MS: Normal gait and station. Digits without clubbing or cyanosis. Skin: No rashes, ecchymoses, or petechiae. Normal temperature, turgor, and texture. Psych: Alert, oriented, appropriate affect, normal judgment/insight    Results:     Lab Results   Component Value Date/Time    WBC 12.5 (H) 12/19/2019 04:13 PM    HGB 13.3 12/19/2019 04:13 PM    HCT 39.9 12/19/2019 04:13 PM    PLATELET 321 (H) 37/14/8280 04:13 PM    MCV 85 12/19/2019 04:13 PM    ABS.  NEUTROPHILS 6.4 12/19/2019 04:13 PM     Lab Results   Component Value Date/Time    Sodium 141 06/29/2020 01:47 PM    Potassium 4.1 06/29/2020 01:47 PM    Chloride 101 06/29/2020 01:47 PM    CO2 28 06/29/2020 01:47 PM    Glucose 99 06/29/2020 01:47 PM    BUN 13 06/29/2020 01:47 PM    Creatinine 0.60 06/29/2020 01:47 PM    GFR est AA >60 06/29/2020 01:47 PM    GFR est non-AA >60 06/29/2020 01:47 PM    Calcium 9.5 06/29/2020 01:47 PM     Lab Results   Component Value Date/Time    Bilirubin, total 0.3 06/29/2020 01:47 PM    ALT (SGPT) 28 06/29/2020 01:47 PM    Alk. phosphatase 89 06/29/2020 01:47 PM    Protein, total 7.7 06/29/2020 01:47 PM    Albumin 3.9 06/29/2020 01:47 PM    Globulin 3.8 06/29/2020 01:47 PM       Bone Scan 2/27/2019  IMPRESSION: No definite evidence of bony metastatic disease. Activity at L5 is  most likely degenerative but correlation with x-rays would be helpful. .     Assessment:   1) Stage IIA IDC right breast ER/UT+ HER2-, s/p Lumpectomy with SLN, TC x 6, XRT, and ongoing AI  2) Osteopenia  3) Persistently elevated Alk Phos        Plan:   1) Continue Exemestane--unclear if needs 10 years--had Oncotype 22 and no lymph nodes--hopefully she is cured.  Back in 6 months  2) Continue Prolia--taking calcium and Vitamin D  3) Bone Scan in Feb did not show active areas         Signed By: Rupinder Dixon MD

## 2020-07-15 NOTE — PROGRESS NOTES
Pt is here for routine follow up, she reports feeling well, no new complaints. Visit Vitals  /79   Pulse 86   Temp 97 °F (36.1 °C)   Resp 18   Ht 5' 6\" (1.676 m)   Wt 239 lb (108.4 kg)   SpO2 96%   BMI 38.58 kg/m²       Pain Scale: 0 - No pain/10  Pain Location:       1. Have you been to the ER, urgent care clinic since your last visit? Hospitalized since your last visit? no    2. Have you seen or consulted any other health care providers outside of the [address] [place] [address] since your last visit? Include any pap smears or colon screening.   No    Health Maintenance reviewed

## 2020-11-13 ENCOUNTER — PATIENT OUTREACH (OUTPATIENT)
Dept: CASE MANAGEMENT | Age: 65
End: 2020-11-13

## 2020-11-13 NOTE — PROGRESS NOTES
Patient contacted regarding recent discharge and COVID-19 risk. Discussed COVID-19 related testing which was not done at this time. Test results were not done. Patient informed of results, if available? n/a      Care Transition Nurse/ Ambulatory Care Manager/ LPN Care Coordinator contacted the patient by telephone to perform post discharge assessment. Verified name and  with patient as identifiers. Patient has following risk factors of: diabetes. CTN/ACM/LPN reviewed discharge instructions, medical action plan and red flags related to discharge diagnosis. Reviewed and educated them on any new and changed medications related to discharge diagnosis. Advised obtaining a 90-day supply of all daily and as-needed medications. Advance Care Planning:   Does patient have an Advance Directive: currently not on file; education provided     Education provided regarding infection prevention, and signs and symptoms of COVID-19 and when to seek medical attention with patient who verbalized understanding. Discussed exposure protocols and quarantine from [address] you at higher risk for severe illness  and given an opportunity for questions and concerns. The patient agrees to contact the COVID-19 hotline [phone] or PCP office for questions related to their healthcare. CTN/ACM/LPN provided contact information for future reference. From CDC: Are you at higher risk for severe illness?  Wash your hands often.  Avoid close contact (6 feet, which is about two arm lengths) with people who are sick.  Put distance between yourself and other people if COVID-19 is spreading in your community.  Clean and disinfect frequently touched surfaces.  Avoid all cruise travel and non-essential air travel.  Call your healthcare professional if you have concerns about COVID-19 and your underlying condition or if you are sick.     For more information on steps you can take to protect yourself, see CDC's How to Protect Yourself      Patient/family/caregiver given information for [employer] and agrees to enroll no  Patient's preferred e-mail:  n/a  Patient's preferred phone number: n/a  Based on Loop alert triggers, patient will be contacted by nurse care manager for worsening symptoms. Plan for follow-up call in 7-14 days based on severity of symptoms and risk factors.

## 2020-11-27 ENCOUNTER — PATIENT OUTREACH (OUTPATIENT)
Dept: CASE MANAGEMENT | Age: 65
End: 2020-11-27

## 2020-11-27 NOTE — PROGRESS NOTES
Patient resolved from Transition of Care episode on 11/27/20. ACM/CTN was unsuccessful at contacting this patient today. Patient/family was provided the following resources and education related to COVID-19 during the initial call:                         Signs, symptoms and red flags related to COVID-19            CDC exposure and quarantine guidelines            [facility] exposure contact - [phone]            Contact for their local Department of Health               Patient has not had any additional ED or hospital visits. No further outreach scheduled with this CTN/ACM. Episode of Care resolved. Patient has this CTN/ACM contact information if future needs arise.

## 2020-12-30 ENCOUNTER — OFFICE VISIT (OUTPATIENT)
Dept: ONCOLOGY | Age: 65
End: 2020-12-30
Payer: MEDICARE

## 2020-12-30 VITALS
BODY MASS INDEX: 40.74 KG/M2 | HEIGHT: 64 IN | WEIGHT: 238.6 LBS | HEART RATE: 84 BPM | OXYGEN SATURATION: 96 % | DIASTOLIC BLOOD PRESSURE: 68 MMHG | RESPIRATION RATE: 16 BRPM | SYSTOLIC BLOOD PRESSURE: 134 MMHG | TEMPERATURE: 98 F

## 2020-12-30 DIAGNOSIS — M81.8 OTHER OSTEOPOROSIS WITHOUT CURRENT PATHOLOGICAL FRACTURE: ICD-10-CM

## 2020-12-30 DIAGNOSIS — M85.80 OSTEOPENIA DUE TO CANCER THERAPY: Primary | ICD-10-CM

## 2020-12-30 PROCEDURE — 1101F PT FALLS ASSESS-DOCD LE1/YR: CPT | Performed by: INTERNAL MEDICINE

## 2020-12-30 PROCEDURE — 99213 OFFICE O/P EST LOW 20 MIN: CPT | Performed by: INTERNAL MEDICINE

## 2020-12-30 PROCEDURE — G8417 CALC BMI ABV UP PARAM F/U: HCPCS | Performed by: INTERNAL MEDICINE

## 2020-12-30 PROCEDURE — 3017F COLORECTAL CA SCREEN DOC REV: CPT | Performed by: INTERNAL MEDICINE

## 2020-12-30 PROCEDURE — G8427 DOCREV CUR MEDS BY ELIG CLIN: HCPCS | Performed by: INTERNAL MEDICINE

## 2020-12-30 PROCEDURE — 1090F PRES/ABSN URINE INCON ASSESS: CPT | Performed by: INTERNAL MEDICINE

## 2020-12-30 PROCEDURE — G9899 SCRN MAM PERF RSLTS DOC: HCPCS | Performed by: INTERNAL MEDICINE

## 2020-12-30 PROCEDURE — G8510 SCR DEP NEG, NO PLAN REQD: HCPCS | Performed by: INTERNAL MEDICINE

## 2020-12-30 PROCEDURE — G8536 NO DOC ELDER MAL SCRN: HCPCS | Performed by: INTERNAL MEDICINE

## 2020-12-30 NOTE — PROGRESS NOTES
Reason for Visit:   Zuhair harper 72 y. o. female who is seen for follow up breast cancer     Treatment History:   Dx: IDC Right breast--T2N0M0--Stage IIA--ER/VT+HER2-  Tx: Lumpectomy with SLN 3/2013.  Oncotype 22. Subsequent chemotherapy with TC x 6 cycles with transition to AI in 2014--was started on letrozole but was switched to [facility] is unaware of why the transition.  Stopped exemestane in July 2020. Dx: Osteopenia  Tx: Prolia every 6 months, Calcium and Vit D    History of Present Illness:       Past Medical History:   Diagnosis Date    Anxiety state, unspecified 6/4/2013    Malignant neoplasm of breast (female), unspecified site 6/4/2013    Menopause     Obesity, unspecified 6/4/2013    Other and unspecified hyperlipidemia 6/4/2013    Radiation therapy complication     Type II or unspecified type diabetes mellitus without mention of complication, uncontrolled 6/4/2013      Past Surgical History:   Procedure Laterality Date    CHEST SURGERY PROCEDURE UNLISTED  April 2013    Portacath    HX BREAST BIOPSY      HX BREAST LUMPECTOMY  Feb 2013    HX BREAST LUMPECTOMY  March 2013    Revision      Social History     Tobacco Use    Smoking status: Current Some Day Smoker     Packs/day: 0.50     Years: 50.00     Pack years: 25.00     Types: Cigarettes    Smokeless tobacco: Never Used   Substance Use Topics    Alcohol use: Yes     Comment: Occasional      Family History   Problem Relation Age of Onset    Heart Failure Mother     Diabetes Father     Heart Disease Father     Lung Disease Maternal Grandmother         TB    Lung Disease Maternal Grandfather         TB    Cancer Sister         Breast    Breast Cancer Sister     Cancer Other         Breast     Current Outpatient Medications   Medication Sig    FAMOTIDINE PO Take 1 Tab by mouth daily.     glimepiride (AMARYL) 4 mg tablet Take one tablet by mouth in the morning  Indications: type 2 diabetes mellitus    atorvastatin (LIPITOR) 40 mg tablet Take 1 tablet by mouth once daily    metFORMIN (GLUCOPHAGE) 1,000 mg tablet Take one tablet by mouth twice daily with meals    FLUoxetine (PROzac) 20 mg capsule Take one tablet by mouth once daily.  aspirin delayed-release 81 mg tablet Take 1 Tab by mouth daily. Indications: treatment to prevent a heart attack    denosumab (PROLIA) 60 mg/mL injection 60 mg by SubCUTAneous route. g 6 months    multivit-min/iron/folic/lutein (CENTRUM SILVER WOMEN PO) Take  by mouth.  exemestane (AROMASIN) 25 mg tablet Take 1 Tab by mouth daily. No current facility-administered medications for this visit. Facility-Administered Medications Ordered in Other Visits   Medication Dose Route Frequency    denosumab (PROLIA) injection 60 mg  60 mg SubCUTAneous ONCE      Allergies   Allergen Reactions    Ace Inhibitors Cough    Sulfa (Sulfonamide Antibiotics) Rash        Review of Systems: A complete review of systems was obtained, negative except as described above. Physical Exam:     Visit Vitals  /68   Pulse 84   Temp 98 °F (36.7 °C) (Oral)   Resp 16   Ht 5' 3.5\" (1.613 m)   Wt 238 lb 9.6 oz (108.2 kg)   SpO2 96%   BMI 41.60 kg/m²     ECOG PS: 1  General: No distress  Eyes: PERRLA, anicteric sclerae  HENT: Atraumatic, OP clear  Neck: Supple  Lymphatic: No cervical, supraclavicular, or inguinal adenopathy  Respiratory: CTAB, normal respiratory effort  Breast: Right lumpectomy and radiation changes--no suspicious areas otherwise  CV: Normal rate, regular rhythm, no murmurs, no peripheral edema  GI: Soft, nontender, nondistended, no masses, no hepatomegaly, no splenomegaly  MS: Normal gait and station. Digits without clubbing or cyanosis. Skin: No rashes, ecchymoses, or petechiae. Normal temperature, turgor, and texture.   Psych: Alert, oriented, appropriate affect, normal judgment/insight    Results:     Lab Results   Component Value Date/Time    WBC 10.9 (H) 08/24/2020 04:17 PM    HGB 12.5 08/24/2020 04:17 PM    HCT 38.3 08/24/2020 04:17 PM    PLATELET 928 12/05/3642 04:17 PM    MCV 85 08/24/2020 04:17 PM    ABS. NEUTROPHILS 6.5 08/24/2020 04:17 PM     Lab Results   Component Value Date/Time    Sodium 141 12/29/2020 12:56 PM    Potassium 4.0 12/29/2020 12:56 PM    Chloride 103 12/29/2020 12:56 PM    CO2 28 12/29/2020 12:56 PM    Glucose 92 12/29/2020 12:56 PM    BUN 13 12/29/2020 12:56 PM    Creatinine 0.73 12/29/2020 12:56 PM    GFR est AA >60 12/29/2020 12:56 PM    GFR est non-AA >60 12/29/2020 12:56 PM    Calcium 9.4 12/29/2020 12:56 PM     Lab Results   Component Value Date/Time    Bilirubin, total <0.2 08/24/2020 04:17 PM    ALT (SGPT) 22 08/24/2020 04:17 PM    Alk. phosphatase 78 08/24/2020 04:17 PM    Protein, total 6.7 08/24/2020 04:17 PM    Albumin 3.7 12/29/2020 12:56 PM    Globulin 3.8 06/29/2020 01:47 PM       Bone Scan 2/27/2019  IMPRESSION: No definite evidence of bony metastatic disease. Activity at L5 is  most likely degenerative but correlation with x-rays would be helpful. Mammo 12/15/2020  IMPRESSION:  BI-RADS 2: Negative benign.  No mammographic evidence of malignancy.     Assessment:   1) Stage IIA IDC right breast ER/OH+ HER2-, s/p Lumpectomy with SLN, TC x 6, XRT, and ongoing AI  2) Osteopenia  3) Persistently elevated Alk Phos     Plan:   1) Stop Exemestane--she does not need 10 years--no lymph node positivity and no high risk Oncotype-hopefully she is cured.  Back prn, at this point she is considered cured  2) Continue Prolia--taking calcium and Vitamin D--repeat Dexa and stop Prolia if normal bone density  3) Bone Scan in Feb did not show active areas      Signed By: Gwen Beltre MD

## 2020-12-30 NOTE — PATIENT INSTRUCTIONS
Stopping Smoking: Care Instructions Your Care Instructions Cigarette smokers crave the nicotine in cigarettes. Giving it up is much harder than simply changing a habit. Your body has to stop craving the nicotine. It is hard to quit, but you can do it. There are many tools that people use to quit smoking. You may find that combining tools works best for you. There are several steps to quitting. First you get ready to quit. Then you get support to help you. After that, you learn new skills and behaviors to become a nonsmoker. For many people, a necessary step is getting and using medicine. Your doctor will help you set up the plan that best meets your needs. You may want to attend a smoking cessation program to help you quit smoking. When you choose a program, look for one that has proven success. Ask your doctor for ideas. You will greatly increase your chances of success if you take medicine as well as get counseling or join a cessation program. 
Some of the changes you feel when you first quit tobacco are uncomfortable. Your body will miss the nicotine at first, and you may feel short-tempered and grumpy. You may have trouble sleeping or concentrating. Medicine can help you deal with these symptoms. You may struggle with changing your smoking habits and rituals. The last step is the tricky one: Be prepared for the smoking urge to continue for a time. This is a lot to deal with, but keep at it. You will feel better. Follow-up care is a key part of your treatment and safety. Be sure to make and go to all appointments, and call your doctor if you are having problems. It's also a good idea to know your test results and keep a list of the medicines you take. How can you care for yourself at home? · Ask your family, friends, and coworkers for support. You have a better chance of quitting if you have help and support. · Join a support group, such as Nicotine Anonymous, for people who are trying to quit smoking. · Consider signing up for a smoking cessation program, such as the American Lung Association's Freedom from Smoking program. 
· Get text messaging support. Go to the website at www.smokefree. gov to sign up for the [facility] program. 
· Set a quit date. Pick your date carefully so that it is not right in the middle of a big deadline or stressful time. Once you quit, do not even take a puff. Get rid of all ashtrays and lighters after your last cigarette. Clean your house and your clothes so that they do not smell of smoke. · Learn how to be a nonsmoker. Think about ways you can avoid those things that make you reach for a cigarette. ? Avoid situations that put you at greatest risk for smoking. For some people, it is hard to have a drink with friends without smoking. For others, they might skip a coffee break with coworkers who smoke. ? Change your daily routine. Take a different route to work or eat a meal in a different place. · Cut down on stress. Calm yourself or release tension by doing an activity you enjoy, such as reading a book, taking a hot bath, or gardening. · Talk to your doctor or pharmacist about nicotine replacement therapy, which replaces the nicotine in your body. You still get nicotine but you do not use tobacco. Nicotine replacement products help you slowly reduce the amount of nicotine you need. These products come in several forms, many of them available over-the-counter: ? Nicotine patches ? Nicotine gum and lozenges ? Nicotine inhaler · Ask your doctor about bupropion (Wellbutrin) or varenicline (Chantix), which are prescription medicines. They do not contain nicotine. They help you by reducing withdrawal symptoms, such as stress and anxiety. · Some people find hypnosis, acupuncture, and massage helpful for ending the smoking habit. · Eat a healthy diet and get regular exercise. Having healthy habits will help your body move past its craving for nicotine. · Be prepared to keep trying. Most people are not successful the first few times they try to quit. Do not get mad at yourself if you smoke again. Make a list of things you learned and think about when you want to try again, such as next week, next month, or next year. Where can you learn more? Go to http://www.gray.com/ Enter Z574 in the search box to learn more about \"Stopping Smoking: Care Instructions. \" Current as of: March 12, 2020               Content Version: 12.6 © 3896-2059 [employer], [employer]. Care instructions adapted under license by [employer] (which disclaims liability or warranty for this information). If you have questions about a medical condition or this instruction, always ask your healthcare professional. [place] [address] any warranty or liability for your use of this information.

## 2020-12-30 NOTE — PROGRESS NOTES
Skyla Blancas a 72 y. o. female who is seen for follow up breast cancer. . Last Mammo was 12/15/2020. Patient is not taking aromasin at this time, she states she was told she could stop it at last visit. Key Oncology Meds             exemestane (AROMASIN) 25 mg tablet Take 1 Tab by mouth daily. Key Pain Meds     The patient is on no pain meds.

## 2021-01-12 ENCOUNTER — TELEPHONE (OUTPATIENT)
Dept: ONCOLOGY | Age: 66
End: 2021-01-12

## 2021-01-12 NOTE — TELEPHONE ENCOUNTER
----- Message from Stefany Mix MD sent at 1/11/2021  7:51 PM EST -----  Osteopenia, continue with Bone agent

## 2021-01-12 NOTE — TELEPHONE ENCOUNTER
Patient returned call, HIPAA verified. Notified patient of Bone Scan results per DR Nathan's note, patient verbalized understanding. Patient thanked for call.

## 2021-08-31 ENCOUNTER — OFFICE VISIT (OUTPATIENT)
Dept: FAMILY MEDICINE CLINIC | Age: 66
End: 2021-08-31
Payer: MEDICARE

## 2021-08-31 VITALS
RESPIRATION RATE: 16 BRPM | DIASTOLIC BLOOD PRESSURE: 70 MMHG | BODY MASS INDEX: 41.66 KG/M2 | HEIGHT: 64 IN | SYSTOLIC BLOOD PRESSURE: 120 MMHG | TEMPERATURE: 97.4 F | HEART RATE: 70 BPM | OXYGEN SATURATION: 97 % | WEIGHT: 244 LBS

## 2021-08-31 DIAGNOSIS — E78.2 MIXED HYPERLIPIDEMIA: ICD-10-CM

## 2021-08-31 DIAGNOSIS — Z13.39 SCREENING FOR ALCOHOLISM: ICD-10-CM

## 2021-08-31 DIAGNOSIS — Z00.00 INITIAL MEDICARE ANNUAL WELLNESS VISIT: ICD-10-CM

## 2021-08-31 DIAGNOSIS — E11.21 TYPE 2 DIABETES WITH NEPHROPATHY (HCC): ICD-10-CM

## 2021-08-31 DIAGNOSIS — E66.01 OBESITY, CLASS III, BMI 40-49.9 (MORBID OBESITY) (HCC): ICD-10-CM

## 2021-08-31 DIAGNOSIS — Z12.11 SCREENING FOR COLON CANCER: Primary | ICD-10-CM

## 2021-08-31 DIAGNOSIS — E11.9 TYPE 2 DIABETES MELLITUS WITHOUT COMPLICATION, WITHOUT LONG-TERM CURRENT USE OF INSULIN (HCC): ICD-10-CM

## 2021-08-31 DIAGNOSIS — Z17.0 MALIGNANT NEOPLASM OF CENTRAL PORTION OF RIGHT BREAST IN FEMALE, ESTROGEN RECEPTOR POSITIVE (HCC): ICD-10-CM

## 2021-08-31 DIAGNOSIS — D75.839 THROMBOCYTOSIS: ICD-10-CM

## 2021-08-31 DIAGNOSIS — C50.111 MALIGNANT NEOPLASM OF CENTRAL PORTION OF RIGHT BREAST IN FEMALE, ESTROGEN RECEPTOR POSITIVE (HCC): ICD-10-CM

## 2021-08-31 LAB
ALBUMIN UR QL STRIP: 10 MG/L
CREATININE, URINE POC: 200 MG/DL
MICROALBUMIN/CREAT RATIO POC: <30 MG/G

## 2021-08-31 PROCEDURE — G0438 PPPS, INITIAL VISIT: HCPCS | Performed by: NURSE PRACTITIONER

## 2021-08-31 PROCEDURE — 82044 UR ALBUMIN SEMIQUANTITATIVE: CPT | Performed by: NURSE PRACTITIONER

## 2021-08-31 PROCEDURE — 36415 COLL VENOUS BLD VENIPUNCTURE: CPT | Performed by: NURSE PRACTITIONER

## 2021-08-31 RX ORDER — METFORMIN HYDROCHLORIDE 1000 MG/1
1000 TABLET ORAL 2 TIMES DAILY WITH MEALS
Qty: 180 TABLET | Refills: 1 | Status: SHIPPED | OUTPATIENT
Start: 2021-08-31 | End: 2022-03-02

## 2021-08-31 RX ORDER — ATORVASTATIN CALCIUM 40 MG/1
40 TABLET, FILM COATED ORAL DAILY
Qty: 90 TABLET | Refills: 1 | Status: SHIPPED | OUTPATIENT
Start: 2021-08-31 | End: 2022-03-02

## 2021-08-31 RX ORDER — FLUOXETINE HYDROCHLORIDE 20 MG/1
20 CAPSULE ORAL DAILY
Qty: 90 CAPSULE | Refills: 1 | Status: SHIPPED | OUTPATIENT
Start: 2021-08-31 | End: 2022-03-02

## 2021-08-31 RX ORDER — GLIMEPIRIDE 4 MG/1
4 TABLET ORAL DAILY
Qty: 90 TABLET | Refills: 1 | Status: SHIPPED | OUTPATIENT
Start: 2021-08-31 | End: 2022-03-02

## 2021-08-31 NOTE — PATIENT INSTRUCTIONS
Medicare Wellness Visit, Female     The best way to live healthy is to have a lifestyle where you eat a well-balanced diet, exercise regularly, limit alcohol use, and quit all forms of tobacco/nicotine, if applicable. Regular preventive services are another way to keep healthy. Preventive services (vaccines, screening tests, monitoring & exams) can help personalize your care plan, which helps you manage your own care. Screening tests can find health problems at the earliest stages, when they are easiest to treat. [facility] follows the current, evidence-based guidelines published by the [facility] ([facility]) when recommending preventive services for our patients. Because we follow these guidelines, sometimes recommendations change over time as research supports it. (For example, mammograms used to be recommended annually. Even though Medicare will still pay for an annual mammogram, the newer guidelines recommend a mammogram every two years for women of average risk). Of course, you and your doctor may decide to screen more often for some diseases, based on your risk and your co-morbidities (chronic disease you are already diagnosed with). Preventive services for you include:  - Medicare offers their members a free annual wellness visit, which is time for you and your primary care provider to discuss and plan for your preventive service needs. Take advantage of this benefit every year!  -All adults over the age of 72 should receive the recommended pneumonia vaccines. Current USPSTF guidelines recommend a series of two vaccines for the best pneumonia protection.   -All adults should have a flu vaccine yearly and a tetanus vaccine every 10 years.   -All adults age 48 and older should receive the shingles vaccines (series of two vaccines).       -All adults age 38-68 who are overweight should have a diabetes screening test once every three years.   -All adults born between [birth year] and 1965 should be screened once for Hepatitis C.  -Other screening tests and preventive services for persons with diabetes include: an eye exam to screen for diabetic retinopathy, a kidney function test, a foot exam, and stricter control over your cholesterol.   -Cardiovascular screening for adults with routine risk involves an electrocardiogram (ECG) at intervals determined by your doctor.   -Colorectal cancer screenings should be done for adults age 54-65 with no increased risk factors for colorectal cancer. There are a number of acceptable methods of screening for this type of cancer. Each test has its own benefits and drawbacks. Discuss with your doctor what is most appropriate for you during your annual wellness visit. The different tests include: colonoscopy (considered the best screening method), a fecal occult blood test, a fecal DNA test, and sigmoidoscopy.    -A bone mass density test is recommended when a woman turns 65 to screen for osteoporosis. This test is only recommended one time, as a screening. Some providers will use this same test as a disease monitoring tool if you already have osteoporosis. -Breast cancer screenings are recommended every other year for women of normal risk, age 54-69.  -Cervical cancer screenings for women over age 72 are only recommended with certain risk factors.      Here is a list of your current Health Maintenance items (your personalized list of preventive services) with a due date:  Health Maintenance Due   Topic Date Due    COVID-19 Vaccine (1) Never done    Colorectal Screening  Never done    Shingles Vaccine (1 of 2) Never done    Smoker or Former Smoker - Annual Lung Cancer Screen  Never done    Pneumococcal Vaccine (1 of 1 - PPSV23) Never done    Diabetic Foot Care  12/22/2020    Hemoglobin A1C    08/24/2021    Albumin Urine Test  08/24/2021    Cholesterol Test   08/24/2021

## 2021-08-31 NOTE — PROGRESS NOTES
1. Have you been to the ER, urgent care clinic since your last visit? Hospitalized since your last visit? no    2. Have you seen or consulted any other health care providers outside of the [address] [place] [address] since your last visit? Include any pap smears or colon screening. No        Chief Complaint   Patient presents with   [facility] Annual Wellness Visit         Visit Vitals  /70 (BP 1 Location: Left upper arm, BP Patient Position: Sitting, BP Cuff Size: Large adult)   Pulse 70   Temp 97.4 °F (36.3 °C) (Temporal)   Resp 16   Ht 5' 3.5\" (1.613 m)   Wt 244 lb (110.7 kg)   SpO2 97%   BMI 42.54 kg/m²       Pain Scale: 0 - No pain/10  Pain Location: This is an Initial Medicare Annual Wellness Exam (AWV) (Performed 12 months after IPPE or effective date of Medicare Part B enrollment, Once in a lifetime)    I have reviewed the patient's medical history in detail and updated the computerized patient record. Assessment/Plan   Education and counseling provided:  Are appropriate based on today's review and evaluation    1. Screening for colon cancer  -     REFERRAL TO GENERAL SURGERY  2. Initial Medicare annual wellness visit  -     AK ANNUAL ALCOHOL SCREEN 15 MIN  -     AMB POC URINE, MICROALBUMIN, SEMIQUANT (3 RESULTS)  -     CBC WITH AUTOMATED DIFF; Future  -     HEMOGLOBIN A1C WITH EAG; Future  -     LIPID PANEL; Future  -     METABOLIC PANEL, COMPREHENSIVE; Future  -     COLLECTION VENOUS BLOOD,VENIPUNCTURE; Future  3. Screening for alcoholism  -     AK ANNUAL ALCOHOL SCREEN 15 MIN  -     AMB POC URINE, MICROALBUMIN, SEMIQUANT (3 RESULTS)  -     CBC WITH AUTOMATED DIFF; Future  -     HEMOGLOBIN A1C WITH EAG; Future  -     LIPID PANEL; Future  -     METABOLIC PANEL, COMPREHENSIVE; Future  -     COLLECTION VENOUS BLOOD,VENIPUNCTURE; Future  4. Obesity, Class III, BMI 40-49.9 (morbid obesity) (HCC)  -     AMB POC URINE, MICROALBUMIN, SEMIQUANT (3 RESULTS)  -     CBC WITH AUTOMATED DIFF;  Future  - HEMOGLOBIN A1C WITH EAG; Future  -     LIPID PANEL; Future  -     METABOLIC PANEL, COMPREHENSIVE; Future  -     COLLECTION VENOUS BLOOD,VENIPUNCTURE; Future  5. Mixed hyperlipidemia  -     atorvastatin (LIPITOR) 40 mg tablet; Take 1 Tablet by mouth daily. , Normal, Disp-90 Tablet, R-1  -     AMB POC URINE, MICROALBUMIN, SEMIQUANT (3 RESULTS)  -     CBC WITH AUTOMATED DIFF; Future  -     HEMOGLOBIN A1C WITH EAG; Future  -     LIPID PANEL; Future  -     METABOLIC PANEL, COMPREHENSIVE; Future  -     COLLECTION VENOUS BLOOD,VENIPUNCTURE; Future  6. Type 2 diabetes with nephropathy (HCC)  -     AMB POC URINE, MICROALBUMIN, SEMIQUANT (3 RESULTS)  -     CBC WITH AUTOMATED DIFF; Future  -     HEMOGLOBIN A1C WITH EAG; Future  -     LIPID PANEL; Future  -     METABOLIC PANEL, COMPREHENSIVE; Future  -     COLLECTION VENOUS BLOOD,VENIPUNCTURE; Future  7. Thrombocytosis (HCC)  -     AMB POC URINE, MICROALBUMIN, SEMIQUANT (3 RESULTS)  -     CBC WITH AUTOMATED DIFF; Future  -     HEMOGLOBIN A1C WITH EAG; Future  -     LIPID PANEL; Future  -     METABOLIC PANEL, COMPREHENSIVE; Future  -     COLLECTION VENOUS BLOOD,VENIPUNCTURE; Future  8. Malignant neoplasm of central portion of right breast in female, estrogen receptor positive (HCC)  -     AMB POC URINE, MICROALBUMIN, SEMIQUANT (3 RESULTS)  -     CBC WITH AUTOMATED DIFF; Future  -     HEMOGLOBIN A1C WITH EAG; Future  -     LIPID PANEL; Future  -     METABOLIC PANEL, COMPREHENSIVE; Future  -     COLLECTION VENOUS BLOOD,VENIPUNCTURE; Future  9. Type 2 diabetes mellitus without complication, without long-term current use of insulin (HCC)  -     glimepiride (AMARYL) 4 mg tablet; Take 1 Tablet by mouth daily. in the morning, Normal, Disp-90 Tablet, R-1  -     AMB POC URINE, MICROALBUMIN, SEMIQUANT (3 RESULTS)  -     CBC WITH AUTOMATED DIFF; Future  -     HEMOGLOBIN A1C WITH EAG; Future  -     LIPID PANEL; Future  -     METABOLIC PANEL, COMPREHENSIVE;  Future  -     COLLECTION VENOUS BLOOD,VENIPUNCTURE; Future       Depression Risk Factor Screening     3 most recent PHQ Screens 8/31/2021   Little interest or pleasure in doing things Not at all   Feeling down, depressed, irritable, or hopeless Not at all   Total Score PHQ 2 0   Trouble falling or staying asleep, or sleeping too much -   Feeling tired or having little energy -   Poor appetite, weight loss, or overeating -   Feeling bad about yourself - or that you are a failure or have let yourself or your family down -   Trouble concentrating on things such as school, work, reading, or watching TV -   Moving or speaking so slowly that other people could have noticed; or the opposite being so fidgety that others notice -   Thoughts of being better off dead, or hurting yourself in some way -   PHQ 9 Score -   How difficult have these problems made it for you to do your work, take care of your home and get along with others -       Alcohol Risk Screen    Do you average more than 1 drink per night or more than 7 drinks a week:  No    On any one occasion in the past three months have you have had more than 3 drinks containing alcohol:  No         Functional Ability and Level of Safety    Hearing: Hearing is good. Activities of Daily Living: The home contains: handrails  Patient does total self care     Ambulation: with no difficulty      Fall Risk:  Fall Risk Assessment, last 12 mths 8/31/2021   Able to walk? Yes   Fall in past 12 months? 0   Do you feel unsteady? 0   Are you worried about falling 0   Is the gait abnormal? -   Number of falls in past 12 months -   Fall with injury?  -      Abuse Screen:  Patient is not abused       Cognitive Screening    Has your family/caregiver stated any concerns about your memory: no     Cognitive Screening: Normal - MMSE (Mini Mental Status Exam)    Health Maintenance Due     Health Maintenance Due   Topic Date Due    COVID-19 Vaccine (1) Never done    Colorectal Cancer Screening Combo  Never done    Shingrix Vaccine Age 50> (1 of 2) Never done    Low dose CT lung screening  Never done    Pneumococcal 65+ years (1 of 1 - PPSV23) Never done    Foot Exam Q1  12/22/2020    A1C test (Diabetic or Prediabetic)  08/24/2021    MICROALBUMIN Q1  08/24/2021    Lipid Screen  08/24/2021       Patient Care Team   Patient Care Team:  Pati Chatterjee NP as PCP - General (Nurse Practitioner)  Pati Chatterjee NP as PCP - [facility] Empaneled Provider    History     Patient Active Problem List   Diagnosis Code    Type 2 diabetes mellitus without complication, without long-term current use of insulin ([address] E11.9    Malignant neoplasm of central portion of right breast in female, estrogen receptor positive ([address] C50.111, Z17.0    Mixed hyperlipidemia E78.2    Obesity, unspecified E66.9    Anxiety state, unspecified F41.1    Osteoarthritis M19.90    Use of letrozole (Femara) Z79.811    Osteopenia M85.80    Fatigue due to treatment R53.83    Use of exemestane (Aromasin) Z79.811    Wrist pain, left M25.532    Post-menopausal osteoporosis M81.0    Obesity, morbid ([address] E66.01    Thrombocytosis ([address] D47.3    Hyperglobulinemia R77.1    Type 2 diabetes with nephropathy ([address] E11.21     Past Medical History:   Diagnosis Date    Anxiety state, unspecified 6/4/2013    Malignant neoplasm of breast (female), unspecified site 6/4/2013    Menopause     Obesity, unspecified 6/4/2013    Other and unspecified hyperlipidemia 6/4/2013    Radiation therapy complication     Type II or unspecified type diabetes mellitus without mention of complication, uncontrolled 6/4/2013      Past Surgical History:   Procedure Laterality Date    HX BREAST BIOPSY      HX BREAST LUMPECTOMY  Feb 2013    HX BREAST LUMPECTOMY  March 2013    Revision    [place] CHEST SURGERY PROCEDURE UNLISTED  April 2013    [facility]     Current Outpatient Medications   Medication Sig Dispense Refill    calcium carbonate/vitamin D3 (CALCIUM+D PO) Take  by mouth.  metFORMIN (GLUCOPHAGE) 1,000 mg tablet TAKE 1 TABLET BY MOUTH TWICE DAILY WITH MEALS 180 Tablet 0    atorvastatin (LIPITOR) 40 mg tablet Take 1 tablet by mouth once daily 90 Tablet 0    glimepiride (AMARYL) 4 mg tablet TAKE 1 TABLET BY MOUTH ONCE DAILY IN THE MORNING 90 Tablet 0    FLUoxetine (PROzac) 20 mg capsule Take 1 capsule by mouth once daily 90 Capsule 0    FAMOTIDINE PO Take 1 Tab by mouth daily.  aspirin delayed-release 81 mg tablet Take 1 Tab by mouth daily. Indications: treatment to prevent a heart attack 90 Tab 3    denosumab (PROLIA) 60 mg/mL injection 60 mg by SubCUTAneous route. g 6 months      multivit-min/iron/folic/lutein (CENTRUM SILVER WOMEN PO) Take  by mouth.        Allergies   Allergen Reactions    Ace Inhibitors Cough    Sulfa (Sulfonamide Antibiotics) Rash       Family History   Problem Relation Age of Onset    Heart Failure Mother     Diabetes Father     Heart Disease Father     Lung Disease Maternal Grandmother         TB    Lung Disease Maternal Grandfather         TB    Cancer Sister         Breast    Breast Cancer Sister     Cancer Other         Breast     Social History     Tobacco Use    Smoking status: Current Some Day Smoker     Packs/day: 0.50     Years: 50.00     Pack years: 25.00     Types: Cigarettes    Smokeless tobacco: Never Used   Substance Use Topics    Alcohol use: Yes     Comment: Occasional       Gregorio Yi RN

## 2021-09-01 LAB
ALBUMIN SERPL-MCNC: 3.7 G/DL (ref 3.5–5)
ALBUMIN/GLOB SERPL: 1.1 {RATIO} (ref 1.1–2.2)
ALP SERPL-CCNC: 95 U/L (ref 45–117)
ALT SERPL-CCNC: 29 U/L (ref 12–78)
ANION GAP SERPL CALC-SCNC: 5 MMOL/L (ref 5–15)
AST SERPL-CCNC: 13 U/L (ref 15–37)
BASOPHILS # BLD: 0.1 K/UL (ref 0–0.1)
BASOPHILS NFR BLD: 1 % (ref 0–1)
BILIRUB SERPL-MCNC: 0.2 MG/DL (ref 0.2–1)
BUN SERPL-MCNC: 15 MG/DL (ref 6–20)
BUN/CREAT SERPL: 25 (ref 12–20)
CALCIUM SERPL-MCNC: 9.4 MG/DL (ref 8.5–10.1)
CHLORIDE SERPL-SCNC: 107 MMOL/L (ref 97–108)
CHOLEST SERPL-MCNC: 181 MG/DL
CO2 SERPL-SCNC: 28 MMOL/L (ref 21–32)
CREAT SERPL-MCNC: 0.59 MG/DL (ref 0.55–1.02)
DIFFERENTIAL METHOD BLD: ABNORMAL
EOSINOPHIL # BLD: 0.3 K/UL (ref 0–0.4)
EOSINOPHIL NFR BLD: 2 % (ref 0–7)
ERYTHROCYTE [DISTWIDTH] IN BLOOD BY AUTOMATED COUNT: 13.2 % (ref 11.5–14.5)
EST. AVERAGE GLUCOSE BLD GHB EST-MCNC: 157 MG/DL
GLOBULIN SER CALC-MCNC: 3.3 G/DL (ref 2–4)
GLUCOSE SERPL-MCNC: 124 MG/DL (ref 65–100)
HBA1C MFR BLD: 7.1 % (ref 4–5.6)
HCT VFR BLD AUTO: 38.5 % (ref 35–47)
HDLC SERPL-MCNC: 51 MG/DL
HDLC SERPL: 3.5 {RATIO} (ref 0–5)
HGB BLD-MCNC: 12.6 G/DL (ref 11.5–16)
IMM GRANULOCYTES # BLD AUTO: 0.1 K/UL (ref 0–0.04)
IMM GRANULOCYTES NFR BLD AUTO: 1 % (ref 0–0.5)
LDLC SERPL CALC-MCNC: 73.8 MG/DL (ref 0–100)
LYMPHOCYTES # BLD: 3 K/UL (ref 0.8–3.5)
LYMPHOCYTES NFR BLD: 27 % (ref 12–49)
MCH RBC QN AUTO: 29.1 PG (ref 26–34)
MCHC RBC AUTO-ENTMCNC: 32.7 G/DL (ref 30–36.5)
MCV RBC AUTO: 88.9 FL (ref 80–99)
MONOCYTES # BLD: 0.8 K/UL (ref 0–1)
MONOCYTES NFR BLD: 8 % (ref 5–13)
NEUTS SEG # BLD: 6.9 K/UL (ref 1.8–8)
NEUTS SEG NFR BLD: 61 % (ref 32–75)
NRBC # BLD: 0 K/UL (ref 0–0.01)
NRBC BLD-RTO: 0 PER 100 WBC
PLATELET # BLD AUTO: 417 K/UL (ref 150–400)
PMV BLD AUTO: 10 FL (ref 8.9–12.9)
POTASSIUM SERPL-SCNC: 4.1 MMOL/L (ref 3.5–5.1)
PROT SERPL-MCNC: 7 G/DL (ref 6.4–8.2)
RBC # BLD AUTO: 4.33 M/UL (ref 3.8–5.2)
SODIUM SERPL-SCNC: 140 MMOL/L (ref 136–145)
TRIGL SERPL-MCNC: 281 MG/DL (ref ?–150)
VLDLC SERPL CALC-MCNC: 56.2 MG/DL
WBC # BLD AUTO: 11.1 K/UL (ref 3.6–11)

## 2021-09-01 NOTE — PROGRESS NOTES
HGBA1C up to 7.1 Continue to monitor diet and encourage daily physical activity. Metabolic panel good liver and kidney functions  Lipid panel triglycerides are coming down.    CBC no anemia

## 2022-01-05 ENCOUNTER — OFFICE VISIT (OUTPATIENT)
Dept: SURGERY | Age: 67
End: 2022-01-05

## 2022-01-05 VITALS
SYSTOLIC BLOOD PRESSURE: 136 MMHG | WEIGHT: 243 LBS | HEART RATE: 93 BPM | HEIGHT: 65 IN | BODY MASS INDEX: 40.48 KG/M2 | DIASTOLIC BLOOD PRESSURE: 68 MMHG

## 2022-01-05 DIAGNOSIS — Z12.11 COLON CANCER SCREENING: Primary | ICD-10-CM

## 2022-01-05 NOTE — PROGRESS NOTES
Lorena Lubin is a 77 y.o. female who presents today with the following:  Chief Complaint   Patient presents with    New Patient    Colon Cancer Screening       HPI    17-year-old female who presents as a referral from María Garcia for possible screening colonoscopy. She has had no prior colon evaluation. She has no known family history of colon cancer. She denies any melena or hematochezia. She denies any diarrhea or constipation. She has had no significant abdominal pain. She denies any unexpected weight loss. She has had no change in the caliber of her stool and no recent stool testing. She has a history of a  as well as toe amputation. She has had right breast cancer that was treated with lumpectomy and axillary node dissection and brachytherapy. She underwent chemotherapy and had a left subclavian port that was placed and subsequently removed. She is a diabetic. She has a history of hypercholesterolemia as wel and panic disorder. She does take an 81 mg aspirin a day. She has been vaccinated for Covid and has not contracted Covid.   Past Medical History:   Diagnosis Date    Anxiety state, unspecified 2013    Malignant neoplasm of breast (female), unspecified site 2013    Menopause     Obesity, unspecified 2013    Other and unspecified hyperlipidemia 2013    Radiation therapy complication     Type II or unspecified type diabetes mellitus without mention of complication, uncontrolled 2013       Past Surgical History:   Procedure Laterality Date    HX BREAST BIOPSY      HX BREAST LUMPECTOMY  2013    HX BREAST LUMPECTOMY  2013    Revision    [place] CHE[facility] SUR[facility] PROCEDURE UNLISTED  2013    [facility]       Social History     Socioeconomic History    Marital status:      Spouse name: Not on file    Number of children: Not on file    Years of education: Not on file    Highest education level: Not on file   Occupational History    Not on file   Tobacco Use    Smoking status: Current Some Day Smoker     Packs/day: 0.50     Years: 50.00     Pack years: 25.00     Types: Cigarettes    Smokeless tobacco: Never Used   Vaping Use    Vaping Use: Never used   Substance and Sexual Activity    Alcohol use: Yes     Comment: Occasional    Drug use: No    Sexual activity: Not on file   Other Topics Concern     Service No    Blood Transfusions No    Caffeine Concern No    Occupational Exposure Yes     Comment: Works with [employer].  Hobby Hazards Yes    Sleep Concern No    Stress Concern No    Weight Concern Not Asked    Special Diet No     Comment: Diabetic    Back Care Yes    Exercise Yes    Bike Helmet Not Asked    Seat Belt Yes    Self-Exams Yes   Social History Narrative    Not on file     Social Determinants of Health     Financial Resource Strain:     Difficulty of Paying Living Expenses: Not on file   Food Insecurity:     Worried About Running Out of Food in the Last Year: Not on file    Elvira of Food in the Last Year: Not on file   Transportation Needs:     Lack of Transportation (Medical): Not on file    Lack of Transportation (Non-Medical):  Not on file   Physical Activity:     Days of Exercise per Week: Not on file    Minutes of Exercise per Session: Not on file   Stress:     Feeling of Stress : Not on file   Social Connections:     Frequency of Communication with Friends and Family: Not on file    Frequency of Social Gatherings with Friends and Family: Not on file    Attends Spiritism Services: Not on file    Active Member of Clubs or Organizations: Not on file    Attends Club or Organization Meetings: Not on file    Marital Status: Not on file   Intimate Partner Violence:     Fear of Current or Ex-Partner: Not on file    Emotionally Abused: Not on file    Physically Abused: Not on file    Sexually Abused: Not on file   Housing Stability:     Unable to Pay for Housing in the Last Year: Not on file    Number of Places Lived in the Last Year: Not on file    Unstable Housing in the Last Year: Not on file       Family History   Problem Relation Age of Onset    Heart Failure Mother     Diabetes Father     Heart Disease Father     Lung Disease Maternal Grandmother         TB    Lung Disease Maternal Grandfather         TB    Cancer Sister         Breast    Breast Cancer Sister     Cancer Other         Breast       Allergies   Allergen Reactions    Ace Inhibitors Cough    Sulfa (Sulfonamide Antibiotics) Rash       Current Outpatient Medications   Medication Sig    calcium carbonate/vitamin D3 (CALCIUM+D PO) Take  by mouth.  metFORMIN (GLUCOPHAGE) 1,000 mg tablet Take 1 Tablet by mouth two (2) times daily (with meals).  atorvastatin (LIPITOR) 40 mg tablet Take 1 Tablet by mouth daily.  glimepiride (AMARYL) 4 mg tablet Take 1 Tablet by mouth daily. in the morning    FLUoxetine (PROzac) 20 mg capsule Take 1 Capsule by mouth daily.  FAMOTIDINE PO Take 1 Tab by mouth daily.  aspirin delayed-release 81 mg tablet Take 1 Tab by mouth daily. Indications: treatment to prevent a heart attack    multivit-min/iron/folic/lutein (CENTRUM SILVER WOMEN PO) Take  by mouth.  denosumab (PROLIA) 60 mg/mL injection 60 mg by SubCUTAneous route. g 6 months (Patient not taking: Reported on 1/5/2022)     No current facility-administered medications for this visit. The above histories, medications and allergies have been reviewed. ROS    Visit Vitals  /68   Pulse 93   Ht 5' 5\" (1.651 m)   Wt 243 lb (110.2 kg)   BMI 40.44 kg/m²     Physical Exam  Cardiovascular:      Rate and Rhythm: Normal rate and regular rhythm. Heart sounds: Normal heart sounds. Pulmonary:      Effort: No respiratory distress. Breath sounds: Normal breath sounds. No stridor. No wheezing. Abdominal:      General: There is no distension. Palpations: Abdomen is soft. There is no mass.       Tenderness: There is no abdominal tenderness. Neurological:      Mental Status: She is alert. 1. Colon cancer screening  Recommend colonoscopy. The procedure was explained in detail including the risks and benefits. Risks shared included risks of missed lesions, incomplete exam, colon injury or perforation. Risks associated with anesthesia were also discussed. The patient wishes to proceed and we will schedule. The patient was counseled at length about the risks of albert Covid-19 during their perioperative period and any recovery window from their procedure. The patient was made aware that albert Covid-19  may worsen their prognosis for recovering from their procedure and lend to a higher morbidity and/or mortality risk. All material risks, benefits, and reasonable alternatives including postponing the procedure were discussed. The patient does  wish to proceed with the procedure at this time. Follow-up and Dispositions    · Return for post procedure.          Rajesh Woods MD

## 2022-01-05 NOTE — LETTER
1/5/2022    Patient: Elizabeth El   YOB: 1955   Date of Visit: 1/5/2022     Thanh Morgan NP  [facility] [address]  [address] [address]  [address] [facility] [address]    Dear Thanh Morgan NP,      Thank you for referring Ms. Lisa Brown to [address] [place] [address] [place] for evaluation. My notes for this consultation are attached. If you have questions, please do not hesitate to call me. I look forward to following your patient along with you.       Sincerely,    Rex Paget, MD

## 2022-01-05 NOTE — PATIENT INSTRUCTIONS
Learning About Colonoscopy  What is a colonoscopy? A colonoscopy is a test (also called a procedure) that lets a doctor look inside your large intestine. The doctor uses a thin, lighted tube called a colonoscope. The doctor uses it to look for small growths called polyps, colon or rectal cancer (colorectal cancer), or other problems like bleeding. During the procedure, the doctor can take samples of tissue. The samples can then be checked for cancer or other conditions. The doctor can also take out polyps. How is a colonoscopy done? This procedure is done in a doctor's office or a clinic or hospital. You will get medicine to help you relax and not feel pain. Some people find that they don't remember having the test because of the medicine. The doctor gently moves the colonoscope, or scope, through the colon. The scope is also a small video camera. It lets the doctor see the colon and take pictures. How do you prepare for the procedure? You need to clean out your colon before the procedure so the doctor can see your colon. This depends on which \"colon prep\" your doctor recommends. To clean out your colon, you'll do a \"colon prep\" before the test. This means you stop eating solid foods and drink only clear liquids. You can have water, tea, coffee, clear juices, clear broths, flavored ice pops, and gelatin (such as Jell-O). Do not drink anything red or purple. The day or night before the procedure, you drink a large amount of a special liquid. This causes loose, frequent stools. You will go to the bathroom a lot. Your doctor may have you drink part of the liquid the evening before and the rest on the day of the test. It's very important to drink all of the liquid. If you have problems drinking it, call your doctor. Arrange to have someone take you home after the test.  What can you expect after a colonoscopy? Your doctor will tell you when you can eat and do your usual activities.   Drink a lot of fluid after the test to replace the fluids you may have lost during the colon prep. But don't drink alcohol. Your doctor will talk to you about when you'll need your next colonoscopy. The results of your test and your risk for colorectal cancer will help your doctor decide how often you need to be checked. After the test, you may be bloated or have gas pains. You may need to pass gas. If a biopsy was done or a polyp was removed, you may have streaks of blood in your stool (feces) for a few days. Check with your doctor to see when it is safe to take aspirin and nonsteroidal anti-inflammatory drugs (NSAIDs) again. Problems such as heavy rectal bleeding may not occur until several weeks after the test. This isn't common. But it can happen after polyps are removed. Follow-up care is a key part of your treatment and safety. Be sure to make and go to all appointments, and call your doctor if you are having problems. It's also a good idea to know your test results and keep a list of the medicines you take. Where can you learn more? Go to http://www.gray.com/  Enter Z368 in the search box to learn more about \"Learning About Colonoscopy. \"  Current as of: December 17, 2020               Content Version: 13.0  © 2006-2021 [employer]. Care instructions adapted under license by [employer] (which disclaims liability or warranty for this information). If you have questions about a medical condition or this instruction, always ask your healthcare professional. [address] [address] any warranty or liability for your use of this information.

## 2022-02-16 ENCOUNTER — ANESTHESIA EVENT (OUTPATIENT)
Dept: SURGERY | Age: 67
End: 2022-02-16
Payer: MEDICARE

## 2022-02-16 NOTE — ANESTHESIA PREPROCEDURE EVALUATION
Relevant Problems   ENDOCRINE   (+) Obesity, morbid (HCC)   (+) Obesity, unspecified   (+) Type 2 diabetes mellitus without complication, without long-term current use of insulin (HCC)   (+) Type 2 diabetes with nephropathy (HCC)      PERSONAL HX & FAMILY HX OF CANCER   (+) Malignant neoplasm of central portion of right breast in female, estrogen receptor positive ([facility]       Anesthetic History   No history of anesthetic complications            Review of Systems / Medical History  Patient summary reviewed, nursing notes reviewed and pertinent labs reviewed    Pulmonary  Within defined limits        Smoker (25 pk yrs)         Neuro/Psych   Within defined limits           Cardiovascular              Hyperlipidemia         GI/Hepatic/Renal  Within defined limits              Endo/Other    Diabetes (with nephropathy)    Morbid obesity (BMI 40)     Other Findings              Physical Exam    Airway  Mallampati: III  TM Distance: 4 - 6 cm  Neck ROM: short neck   Mouth opening: Normal     Cardiovascular    Rhythm: regular  Rate: normal         Dental  No notable dental hx       Pulmonary  Breath sounds clear to auscultation               Abdominal  GI exam deferred       Other Findings            Anesthetic Plan    ASA: 2  Anesthesia type: MAC          Induction: Intravenous  Anesthetic plan and risks discussed with: Patient

## 2022-02-18 NOTE — PERIOP NOTES
[address] [place] [address]  SURGICAL PRE-ADMISSION INSTRUCTIONS    ARRIVAL  · You will be called the day before your surgery with your expected arrival time. · Sign in at the  of the hospital.  You will be directed to the Surgical Waiting Room. · Please arrive at your scheduled appointment time. You have been scheduled to arrive for your procedure one or two hours prior to the expected start time of your procedure. · Every effort will be made to minimize your wait but please be aware that unforeseen circumstances may affect our schedule. EATING  · DO NOT EAT OR DRINK ANYTHING AFTER MIDNIGHT ON THE EVENING BEFORE YOUR SURGERY OR ON THE DAY OF YOUR SURGERY except for your medications (as instructed) with a sip of water. · Do not use gum, mints or lozenges on the morning of your surgery. · Please do not smoke or chew tobacco before your surgery. MEDICATIONS   · Take the following medications on the morning of your surgery with the smallest amount of water possible : NO AM MEDs    STOP THESE MEDICATIONS AT THE TIMES LISTED BELOW  Aspirin ;  7 days before                                                   DRIVING/TRANSPORATION  · Have a responsible adult to drive you home from the hospital and to stay with you over night. Please have them plan to remain in the hospital during your surgery. Your surgery will not be done if you do not have a responsible adult to take you home and to stay with you. · If you have arranged for public transport, you must have a responsible adult to ride with you (who is not the ). · You may not drive for 24 hours after anesthesia. PREPARATION  · If you have a Living WiIl/Advance Directive, please bring a copy with you to scan into your chart. · Please DO NOT wear makeup or nail polish  · Please leave valuables at home,  DO NOT wear jewelry. · Wear loose, comfortable clothing that is large enough to cover a bulky dressing.     SPECIAL INSTRUCTIONS:  · Follow your surgeon's instructions for preoperative bowel prep.     Reviewed above preoperative instructions and answered questions by phone interview    Patient:  Aleksey Niki   Date:     February 18, 2022  Time:   11:28 AM    RN:  Pranav Mckenzie RN    Date:     February 18, 2022  Time:   11:28 AM

## 2022-02-21 ENCOUNTER — HOSPITAL ENCOUNTER (OUTPATIENT)
Dept: PREADMISSION TESTING | Age: 67
Discharge: HOME | End: 2022-02-21
Attending: SURGERY
Payer: MEDICARE

## 2022-02-21 PROCEDURE — U0005 INFEC AGEN DETEC AMPLI PROBE: HCPCS

## 2022-02-22 LAB
SARS-COV-2, XPLCVT: NOT DETECTED
SOURCE, COVRS: NORMAL

## 2022-02-23 NOTE — H&P
History and Physical  HPI    55-year-old female who presents as a referral from [facility] for possible screening colonoscopy. She has had no prior colon evaluation. She has no known family history of colon cancer. She denies any melena or hematochezia. She denies any diarrhea or constipation. She has had no significant abdominal pain. She denies any unexpected weight loss. She has had no change in the caliber of her stool and no recent stool testing. She has a history of a  as well as toe amputation. She has had right breast cancer that was treated with lumpectomy and axillary node dissection and brachytherapy. She underwent chemotherapy and had a left subclavian port that was placed and subsequently removed. She is a diabetic. She has a history of hypercholesterolemia as wel and panic disorder. She does take an 81 mg aspirin a day. She has been vaccinated for Covid and has not contracted Covid.   Past Medical History:   Diagnosis Date    Anxiety state, unspecified 2013    Malignant neoplasm of breast (female), unspecified site 2013    Right Breast    Menopause     Obesity, unspecified 2013    Other and unspecified hyperlipidemia 2013    Radiation therapy complication     Type II or unspecified type diabetes mellitus without mention of complication, uncontrolled 2013       Past Surgical History:   Procedure Laterality Date    HX BREAST BIOPSY      HX BREAST LUMPECTOMY  2013    HX BREAST LUMPECTOMY  2013    Revision    MA CHEST SURGERY PROCEDURE UNLISTED  2013    [facility]       Social History     Socioeconomic History    Marital status:      Spouse name: Not on file    Number of children: Not on file    Years of education: Not on file    Highest education level: Not on file   Occupational History    Not on file   Tobacco Use    Smoking status: Current Some Day Smoker     Packs/day: 0.50     Years: 50.00     Pack years: 25.00     Types: Cigarettes    Smokeless tobacco: Never Used   Vaping Use    Vaping Use: Never used   Substance and Sexual Activity    Alcohol use: Yes     Comment: Occasional    Drug use: No    Sexual activity: Not on file   Other Topics Concern     Service No    Blood Transfusions No    Caffeine Concern No    Occupational Exposure Yes     Comment: Works with [employer].  Hobby Hazards Yes    Sleep Concern No    Stress Concern No    Weight Concern Not Asked    Special Diet No     Comment: Diabetic    Back Care Yes    Exercise Yes    Bike Helmet Not Asked    Seat Belt Yes    Self-Exams Yes   Social History Narrative    Not on file     Social Determinants of Health     Financial Resource Strain:     Difficulty of Paying Living Expenses: Not on file   Food Insecurity:     Worried About Running Out of Food in the Last Year: Not on file    Elvira of Food in the Last Year: Not on file   Transportation Needs:     Lack of Transportation (Medical): Not on file    Lack of Transportation (Non-Medical):  Not on file   Physical Activity:     Days of Exercise per Week: Not on file    Minutes of Exercise per Session: Not on file   Stress:     Feeling of Stress : Not on file   Social Connections:     Frequency of Communication with Friends and Family: Not on file    Frequency of Social Gatherings with Friends and Family: Not on file    Attends Restorationism Services: Not on file    Active Member of [address] [place] [address] or Organizations: Not on file    Attends Club or Organization Meetings: Not on file    Marital Status: Not on file   Intimate Partner Violence:     Fear of Current or Ex-Partner: Not on file    Emotionally Abused: Not on file    Physically Abused: Not on file    Sexually Abused: Not on file   Housing Stability:     Unable to Pay for Housing in the Last Year: Not on file    Number of [place] in the Last Year: Not on file    Unstable Housing in the Last Year: Not on file Family History   Problem Relation Age of Onset    Heart Failure Mother     Diabetes Father     Heart Disease Father     Lung Disease Maternal Grandmother         TB    Lung Disease Maternal Grandfather         TB    Cancer Sister         Breast    Breast Cancer Sister     Cancer Other         Breast       Allergies   Allergen Reactions    Ace Inhibitors Cough    Sulfa (Sulfonamide Antibiotics) Rash       No current facility-administered medications for this encounter. Current Outpatient Medications   Medication Sig    calcium carbonate/vitamin D3 (CALCIUM+D PO) Take  by mouth.  metFORMIN (GLUCOPHAGE) 1,000 mg tablet Take 1 Tablet by mouth two (2) times daily (with meals).  atorvastatin (LIPITOR) 40 mg tablet Take 1 Tablet by mouth daily.  glimepiride (AMARYL) 4 mg tablet Take 1 Tablet by mouth daily. in the morning    FLUoxetine (PROzac) 20 mg capsule Take 1 Capsule by mouth daily.  FAMOTIDINE PO Take 1 Tab by mouth daily.  aspirin delayed-release 81 mg tablet Take 1 Tab by mouth daily. Indications: treatment to prevent a heart attack    multivit-min/iron/folic/lutein (CENTRUM SILVER WOMEN PO) Take  by mouth.  denosumab (PROLIA) 60 mg/mL injection 60 mg by SubCUTAneous route. g 6 months (Patient not taking: Reported on 1/5/2022)       The above histories, medications and allergies have been reviewed. ROS    There were no vitals taken for this visit. Physical Exam  Cardiovascular:      Rate and Rhythm: Normal rate and regular rhythm. Heart sounds: Normal heart sounds. Pulmonary:      Effort: No respiratory distress. Breath sounds: Normal breath sounds. No stridor. No wheezing. Abdominal:      General: There is no distension. Palpations: Abdomen is soft. There is no mass. Tenderness: There is no abdominal tenderness. Neurological:      Mental Status: She is alert. 1. Colon cancer screening  Recommend colonoscopy.   The procedure was explained in detail including the risks and benefits. Risks shared included risks of missed lesions, incomplete exam, colon injury or perforation. Risks associated with anesthesia were also discussed. The patient wishes to proceed and we will schedule. The patient was counseled at length about the risks of albert Covid-19 during their perioperative period and any recovery window from their procedure. The patient was made aware that albert Covid-19  may worsen their prognosis for recovering from their procedure and lend to a higher morbidity and/or mortality risk. All material risks, benefits, and reasonable alternatives including postponing the procedure were discussed. The patient does  wish to proceed with the procedure at this time.               Mackenzie Ramirez MD

## 2022-02-24 ENCOUNTER — ANESTHESIA (OUTPATIENT)
Dept: SURGERY | Age: 67
End: 2022-02-24
Payer: MEDICARE

## 2022-02-24 ENCOUNTER — HOSPITAL ENCOUNTER (OUTPATIENT)
Age: 67
Setting detail: OUTPATIENT SURGERY
Discharge: HOME | End: 2022-02-24
Attending: SURGERY | Admitting: SURGERY
Payer: MEDICARE

## 2022-02-24 VITALS
WEIGHT: 244 LBS | RESPIRATION RATE: 20 BRPM | HEART RATE: 83 BPM | DIASTOLIC BLOOD PRESSURE: 49 MMHG | TEMPERATURE: 97.3 F | SYSTOLIC BLOOD PRESSURE: 124 MMHG | HEIGHT: 65 IN | BODY MASS INDEX: 40.65 KG/M2 | OXYGEN SATURATION: 96 %

## 2022-02-24 DIAGNOSIS — Z12.11 COLON CANCER SCREENING: ICD-10-CM

## 2022-02-24 LAB
GLUCOSE BLD STRIP.AUTO-MCNC: 176 MG/DL (ref 65–117)
SERVICE CMNT-IMP: ABNORMAL

## 2022-02-24 PROCEDURE — 74011250636 HC RX REV CODE- 250/636: Performed by: SURGERY

## 2022-02-24 PROCEDURE — 2709999900 HC NON-CHARGEABLE SUPPLY: Performed by: SURGERY

## 2022-02-24 PROCEDURE — 88305 TISSUE EXAM BY PATHOLOGIST: CPT

## 2022-02-24 PROCEDURE — 77030009426 HC FCPS BIOP ENDOSC BSC -B: Performed by: SURGERY

## 2022-02-24 PROCEDURE — 82962 GLUCOSE BLOOD TEST: CPT

## 2022-02-24 PROCEDURE — 76210000063 HC OR PH I REC FIRST 0.5 HR: Performed by: SURGERY

## 2022-02-24 PROCEDURE — 77030013992 HC SNR POLYP ENDOSC BSC -B: Performed by: SURGERY

## 2022-02-24 PROCEDURE — 74011250636 HC RX REV CODE- 250/636: Performed by: ANESTHESIOLOGY

## 2022-02-24 PROCEDURE — 77030037006 HC FCPS BIOP ENDOSC DISP OCOA -A: Performed by: SURGERY

## 2022-02-24 PROCEDURE — 76060000032 HC ANESTHESIA 0.5 TO 1 HR: Performed by: SURGERY

## 2022-02-24 PROCEDURE — 76010000138 HC OR TIME 0.5 TO 1 HR: Performed by: SURGERY

## 2022-02-24 PROCEDURE — 74011000250 HC RX REV CODE- 250: Performed by: ANESTHESIOLOGY

## 2022-02-24 PROCEDURE — 45380 COLONOSCOPY AND BIOPSY: CPT | Performed by: SURGERY

## 2022-02-24 RX ORDER — SODIUM CHLORIDE 0.9 % (FLUSH) 0.9 %
5-40 SYRINGE (ML) INJECTION EVERY 8 HOURS
Status: DISCONTINUED | OUTPATIENT
Start: 2022-02-24 | End: 2022-02-24 | Stop reason: HOSPADM

## 2022-02-24 RX ORDER — SODIUM CHLORIDE 0.9 % (FLUSH) 0.9 %
5-40 SYRINGE (ML) INJECTION AS NEEDED
Status: DISCONTINUED | OUTPATIENT
Start: 2022-02-24 | End: 2022-02-24 | Stop reason: HOSPADM

## 2022-02-24 RX ORDER — SODIUM CHLORIDE, SODIUM LACTATE, POTASSIUM CHLORIDE, CALCIUM CHLORIDE 600; 310; 30; 20 MG/100ML; MG/100ML; MG/100ML; MG/100ML
125 INJECTION, SOLUTION INTRAVENOUS CONTINUOUS
Status: DISCONTINUED | OUTPATIENT
Start: 2022-02-24 | End: 2022-02-24 | Stop reason: HOSPADM

## 2022-02-24 RX ORDER — PROPOFOL 10 MG/ML
INJECTION, EMULSION INTRAVENOUS AS NEEDED
Status: DISCONTINUED | OUTPATIENT
Start: 2022-02-24 | End: 2022-02-24 | Stop reason: HOSPADM

## 2022-02-24 RX ORDER — DROPERIDOL 2.5 MG/ML
INJECTION, SOLUTION INTRAMUSCULAR; INTRAVENOUS AS NEEDED
Status: DISCONTINUED | OUTPATIENT
Start: 2022-02-24 | End: 2022-02-24 | Stop reason: HOSPADM

## 2022-02-24 RX ORDER — MIDAZOLAM HYDROCHLORIDE 1 MG/ML
INJECTION, SOLUTION INTRAMUSCULAR; INTRAVENOUS AS NEEDED
Status: DISCONTINUED | OUTPATIENT
Start: 2022-02-24 | End: 2022-02-24 | Stop reason: HOSPADM

## 2022-02-24 RX ORDER — GLYCOPYRROLATE 0.2 MG/ML
INJECTION INTRAMUSCULAR; INTRAVENOUS AS NEEDED
Status: DISCONTINUED | OUTPATIENT
Start: 2022-02-24 | End: 2022-02-24 | Stop reason: HOSPADM

## 2022-02-24 RX ADMIN — PROPOFOL 30 MG: 10 INJECTION, EMULSION INTRAVENOUS at 08:35

## 2022-02-24 RX ADMIN — PROPOFOL 60 MG: 10 INJECTION, EMULSION INTRAVENOUS at 08:13

## 2022-02-24 RX ADMIN — PROPOFOL 30 MG: 10 INJECTION, EMULSION INTRAVENOUS at 08:02

## 2022-02-24 RX ADMIN — DROPERIDOL 0.62 MG: 2.5 INJECTION, SOLUTION INTRAMUSCULAR; INTRAVENOUS at 08:45

## 2022-02-24 RX ADMIN — MIDAZOLAM 4 MG: 1 INJECTION INTRAMUSCULAR; INTRAVENOUS at 07:54

## 2022-02-24 RX ADMIN — PROPOFOL 30 MG: 10 INJECTION, EMULSION INTRAVENOUS at 08:32

## 2022-02-24 RX ADMIN — PROPOFOL 30 MG: 10 INJECTION, EMULSION INTRAVENOUS at 08:09

## 2022-02-24 RX ADMIN — PROPOFOL 20 MG: 10 INJECTION, EMULSION INTRAVENOUS at 07:59

## 2022-02-24 RX ADMIN — PROPOFOL 30 MG: 10 INJECTION, EMULSION INTRAVENOUS at 07:54

## 2022-02-24 RX ADMIN — PROPOFOL 30 MG: 10 INJECTION, EMULSION INTRAVENOUS at 08:06

## 2022-02-24 RX ADMIN — PROPOFOL 20 MG: 10 INJECTION, EMULSION INTRAVENOUS at 08:23

## 2022-02-24 RX ADMIN — PROPOFOL 30 MG: 10 INJECTION, EMULSION INTRAVENOUS at 08:37

## 2022-02-24 RX ADMIN — GLYCOPYRROLATE 0.2 MG: 0.2 INJECTION, SOLUTION INTRAMUSCULAR; INTRAVENOUS at 08:26

## 2022-02-24 RX ADMIN — PROPOFOL 30 MG: 10 INJECTION, EMULSION INTRAVENOUS at 08:26

## 2022-02-24 RX ADMIN — SODIUM CHLORIDE, POTASSIUM CHLORIDE, SODIUM LACTATE AND CALCIUM CHLORIDE 125 ML/HR: 600; 310; 30; 20 INJECTION, SOLUTION INTRAVENOUS at 07:45

## 2022-02-24 RX ADMIN — SODIUM CHLORIDE, POTASSIUM CHLORIDE, SODIUM LACTATE AND CALCIUM CHLORIDE: 600; 310; 30; 20 INJECTION, SOLUTION INTRAVENOUS at 08:33

## 2022-02-24 RX ADMIN — PROPOFOL 40 MG: 10 INJECTION, EMULSION INTRAVENOUS at 08:28

## 2022-02-24 RX ADMIN — PROPOFOL 30 MG: 10 INJECTION, EMULSION INTRAVENOUS at 08:18

## 2022-02-24 NOTE — DISCHARGE INSTRUCTIONS
Patient Education        Colonoscopy: What to Expect at Home  Your Recovery  After a colonoscopy, you'll stay at the clinic until you wake up. Then you can go home. But you'll need to arrange for a ride. Your doctor will tell you when you can eat and do your other usual activities. Your doctor will talk to you about when you'll need your next colonoscopy. Your doctor can help you decide how often you need to be checked. This will depend on the results of your test and your risk for colorectal cancer. After the test, you may be bloated or have gas pains. You may need to pass gas. If a biopsy was done or a polyp was removed, you may have streaks of blood in your stool (feces) for a few days. Problems such as heavy rectal bleeding may not occur until several weeks after the test. This isn't common. But it can happen after polyps are removed. This care sheet gives you a general idea about how long it will take for you to recover. But each person recovers at a different pace. Follow the steps below to get better as quickly as possible. How can you care for yourself at home? Activity    · Rest when you feel tired.     · You can do your normal activities when it feels okay to do so. Diet    · Follow your doctor's directions for eating.     · Unless your doctor has told you not to, drink plenty of fluids. This helps to replace the fluids that were lost during the colon prep.     · Do not drink alcohol. Medicines    · Your doctor will tell you if and when you can restart your medicines. You will also be given instructions about taking any new medicines.     · If you take aspirin or some other blood thinner, ask your doctor if and when to start taking it again. Make sure that you understand exactly what your doctor wants you to do.     · If polyps were removed or a biopsy was done during the test, your doctor may tell you not to take aspirin or other anti-inflammatory medicines for a few days.  These include ibuprofen (Advil, Motrin) and naproxen (Aleve). Other instructions    · For your safety, do not drive or operate machinery until the medicine wears off and you can think clearly. Your doctor may tell you not to drive or operate machinery until the day after your test.     · Do not sign legal documents or make major decisions until the medicine wears off and you can think clearly. The anesthesia can make it hard for you to fully understand what you are agreeing to. Follow-up care is a key part of your treatment and safety. Be sure to make and go to all appointments, and call your doctor if you are having problems. It's also a good idea to know your test results and keep a list of the medicines you take. When should you call for help? Call 911 anytime you think you may need emergency care. For example, call if:    · You passed out (lost consciousness).     · You pass maroon or bloody stools.     · You have trouble breathing. Call your doctor now or seek immediate medical care if:    · You have pain that does not get better after you take pain medicine.     · You are sick to your stomach or cannot drink fluids.     · You have new or worse belly pain.     · You have blood in your stools.     · You have a fever.     · You cannot pass stools or gas. Watch closely for changes in your health, and be sure to contact your doctor if you have any problems. Where can you learn more? Go to http://www.gray.com/  Enter E264 in the search box to learn more about \"Colonoscopy: What to Expect at Home. \"  Current as of: December 17, 2020               Content Version: 13.0  © 3436-3127 Healthwise, Incorporated. Care instructions adapted under license by [employer] (which disclaims liability or warranty for this information).  If you have questions about a medical condition or this instruction, always ask your healthcare professional. Thanh Dela Cruz disclaims any warranty or liability for your use of this information.

## 2022-02-24 NOTE — BRIEF OP NOTE
Brief Postoperative Note    Patient: Megan Gallardo  YOB: 1955  MRN: [id number]    Date of Procedure: 2/24/2022     Pre-Op Diagnosis: COLON CANCER SCREENING    Post-Op Diagnosis: Same as preoperative diagnosis. Procedure(s):  COLONOSCOPY WITH POLYPECTOMY    Surgeon(s):  Anibal Rothman MD    Surgical Assistant: None    Anesthesia: MAC     Estimated Blood Loss (mL): Minimal    Complications: None    Specimens:   ID Type Source Tests Collected by Time Destination   1 : cecal polyp Preservative Cecum  Anibal Rothman MD 2/24/2022 1340 Pathology   2 : mid-ascending colon polyp Preservative Colon  Anibal Rothman MD 2/24/2022 3178 Pathology   3 : hepatic flexure polyp Preservative Colon  Anibal Rothman MD 2/24/2022 1060 Pathology        Implants: * No implants in log *    Drains: * No LDAs found *    Findings: 1. Diminutive colon polyps X 3  2.  Internal hemorrhoids     Electronically Signed by Melisa Bueno MD on 2/24/2022 at 8:43 AM

## 2022-02-24 NOTE — ANESTHESIA POSTPROCEDURE EVALUATION
Procedure(s):  COLONOSCOPY WITH POLYPECTOMY.     MAC    Anesthesia Post Evaluation      Multimodal analgesia: multimodal analgesia not used between 6 hours prior to anesthesia start to PACU discharge  Patient location during evaluation: bedside  Patient participation: complete - patient participated  Level of consciousness: awake and alert  Pain score: 0  Pain management: adequate  Airway patency: patent  Anesthetic complications: no  Cardiovascular status: acceptable  Respiratory status: acceptable  Hydration status: acceptable  Post anesthesia nausea and vomiting:  none  Final Post Anesthesia Temperature Assessment:  Normothermia (36.0-37.5 degrees C)      INITIAL Post-op Vital signs:   Vitals Value Taken Time   /49 02/24/22 0910   Temp 36.3 °C (97.3 °F) 02/24/22 0848   Pulse 83 02/24/22 0911   Resp 20 02/24/22 0911   SpO2 96 % 02/24/22 0911

## 2022-02-24 NOTE — OP NOTES
[facility]  OPERATIVE REPORT    Name:  Nasim Lord  MR#:  [id number]  :  1955  ACCOUNT #:  [de-identified]  DATE OF SERVICE:  2022    PREOPERATIVE DIAGNOSIS:  Screening colonoscopy. POSTOPERATIVE DIAGNOSIS:  Screening colonoscopy. PROCEDURE PERFORMED:  Colonoscopy with cold biopsy polypectomy x3. SURGEON:  Jose Hennessy MD    ASSISTANT:  None    ANESTHESIA:  MAC.    COMPLICATIONS:  None. SPECIMENS REMOVED:  1. Cecal polyp. 2.  Mid-ascending colon polyp. 3.  Hepatic flexure polyp. IMPLANTS:  None    ESTIMATED BLOOD LOSS:  Minimal.    FINDINGS:  1. Diminutive colon polyps x3.  2.  Internal hemorrhoids. DISPOSITION:  Stable. PROCEDURE:  The patient was brought to the operative theater. Monitoring devices and nasal cannula O2 were placed per Anesthesia, and the patient was placed in left side down decubitus position. IV sedation was administered, and a time-out was performed. Digital rectal exam was performed which was essentially normal.  A well-lubricated Olympus colonoscope was introduced in the patient's rectum and advanced to the cecum. The cecum was identified by the ileocecal valve and the appendiceal orifice. The prep was adequate to proceed. The scope was carefully withdrawn, and the mucosal surfaces circumferentially evaluated. We identified a small polypoid in the cecum that was removed by cold biopsy forceps. In the mid ascending colon, there was another polypoid lesion. This was initially biopsied with cold biopsy forceps, with specimen retrieved. We then performed a cold snare polypectomy. This resulted in multiple small fragments that were not collected. We then found a small polyp at the hepatic flexure that was removed in its entirety by cold biopsy forceps. All three specimens were sent separately. The scope was pulled back through the transverse colon with no additional lesions seen.   The descending colon showed no abnormalities nor did the sigmoid colon. In the rectum, the scope was pulled back. We did not retroflex. The patient did have internal hemorrhoids. The scope was then carefully withdrawn. The patient tolerated procedure well and was transferred to PACU in stable condition.       Earmon Councilman, MD      [id number]  D:  02/24/2022 8:47  T:  02/24/2022 10:43  JOB #:  [id number]  CC:  Winston Osman NP

## 2022-02-24 NOTE — INTERVAL H&P NOTE
Update History & Physical    The Patient's History and Physical of February 23, 2022 was reviewed with the patient and I examined the patient. There was no change. The surgical site was confirmed by the patient and me. Plan:  The risk, benefits, expected outcome, and alternative to the recommended procedure have been discussed with the patient. Patient understands and wants to proceed with the procedure.     Electronically signed by Arnol Valdes MD on 2/24/2022 at 7:46 AM

## 2022-03-02 DIAGNOSIS — E11.9 TYPE 2 DIABETES MELLITUS WITHOUT COMPLICATION, WITHOUT LONG-TERM CURRENT USE OF INSULIN (HCC): ICD-10-CM

## 2022-03-02 DIAGNOSIS — E78.2 MIXED HYPERLIPIDEMIA: ICD-10-CM

## 2022-03-02 RX ORDER — ATORVASTATIN CALCIUM 40 MG/1
TABLET, FILM COATED ORAL
Qty: 90 TABLET | Refills: 0 | Status: SHIPPED | OUTPATIENT
Start: 2022-03-02 | End: 2022-03-28 | Stop reason: DRUGHIGH

## 2022-03-02 RX ORDER — FLUOXETINE HYDROCHLORIDE 20 MG/1
CAPSULE ORAL
Qty: 90 CAPSULE | Refills: 0 | Status: SHIPPED | OUTPATIENT
Start: 2022-03-02 | End: 2022-06-02

## 2022-03-02 RX ORDER — GLIMEPIRIDE 4 MG/1
TABLET ORAL
Qty: 90 TABLET | Refills: 0 | Status: SHIPPED | OUTPATIENT
Start: 2022-03-02 | End: 2022-06-02

## 2022-03-02 RX ORDER — METFORMIN HYDROCHLORIDE 1000 MG/1
TABLET ORAL
Qty: 180 TABLET | Refills: 0 | Status: SHIPPED | OUTPATIENT
Start: 2022-03-02 | End: 2022-06-02

## 2022-03-04 ENCOUNTER — TELEPHONE (OUTPATIENT)
Dept: SURGERY | Age: 67
End: 2022-03-04

## 2022-03-07 ENCOUNTER — OFFICE VISIT (OUTPATIENT)
Dept: SURGERY | Age: 67
End: 2022-03-07
Payer: MEDICARE

## 2022-03-07 VITALS
HEIGHT: 65 IN | BODY MASS INDEX: 40.65 KG/M2 | SYSTOLIC BLOOD PRESSURE: 136 MMHG | DIASTOLIC BLOOD PRESSURE: 67 MMHG | WEIGHT: 244 LBS | HEART RATE: 84 BPM

## 2022-03-07 DIAGNOSIS — Z86.010 HX OF COLONIC POLYPS: Primary | ICD-10-CM

## 2022-03-07 PROCEDURE — G8417 CALC BMI ABV UP PARAM F/U: HCPCS | Performed by: SURGERY

## 2022-03-07 PROCEDURE — 3017F COLORECTAL CA SCREEN DOC REV: CPT | Performed by: SURGERY

## 2022-03-07 PROCEDURE — G8427 DOCREV CUR MEDS BY ELIG CLIN: HCPCS | Performed by: SURGERY

## 2022-03-07 PROCEDURE — G8510 SCR DEP NEG, NO PLAN REQD: HCPCS | Performed by: SURGERY

## 2022-03-07 PROCEDURE — G9899 SCRN MAM PERF RSLTS DOC: HCPCS | Performed by: SURGERY

## 2022-03-07 PROCEDURE — 99213 OFFICE O/P EST LOW 20 MIN: CPT | Performed by: SURGERY

## 2022-03-07 PROCEDURE — G8536 NO DOC ELDER MAL SCRN: HCPCS | Performed by: SURGERY

## 2022-03-07 PROCEDURE — 1101F PT FALLS ASSESS-DOCD LE1/YR: CPT | Performed by: SURGERY

## 2022-03-07 PROCEDURE — 1090F PRES/ABSN URINE INCON ASSESS: CPT | Performed by: SURGERY

## 2022-03-07 NOTE — PROGRESS NOTES
[address] [facility] Surgery      Clinic Note - Follow up    Subjective     Gail Goodrich returns for scheduled follow up today. She is status post colonoscopy that was performed back on February 24. Findings of 3 adenomatous polyps were shared with her. She has done well since the procedure with no problems. Objective     Visit Vitals  /67   Pulse 84   Ht 5' 5\" (1.651 m)   Wt 244 lb (110.7 kg)   BMI 40.60 kg/m²         PE  GEN - Awake, alert, communicating appropriately. NAD    Assessment     Gail Goodrich is a 79 y. o.yr old female status post colonoscopy with findings of 3 adenomatous polyps. Plan     Recommend repeat colonoscopy in 3 years or sooner if she has any problems. I also encouraged her to consider a fiber supplement.     Darling Hernandez MD    CC: Ken Agrawal

## 2022-03-07 NOTE — LETTER
3/7/2022    Patient: Mamie Jefferson   YOB: 1955   Date of Visit: 3/7/2022     [facility]  [address] [address]  [address]ia [address] [place] [address], RN  [address] [place] [address]  Via     Dear MARISSA Hu, ADAMS,      Thank you for referring Ms. Brien Osman to [address] [place] [address] for evaluation. My notes for this consultation are attached. If you have questions, please do not hesitate to call me. I look forward to following your patient along with you.       Sincerely,    Cori Gracia MD

## 2022-03-07 NOTE — PATIENT INSTRUCTIONS
Colon Polyps: Care Instructions  Your Care Instructions     Colon polyps are growths in the colon or the rectum. The cause of most colon polyps is not known, and most people who get them do not have any problems. But a certain kind can turn into cancer. For this reason, regular testing for colon polyps is important for people as they get older. It is also important for anyone who has an increased risk for colon cancer. Polyps are usually found through routine colon cancer screening tests. Although most colon polyps are not cancerous, they are usually removed and then tested for cancer. Screening for colon cancer saves lives because the cancer can usually be cured if it is caught early. If you have a polyp that is the type that can turn into cancer, you may need more tests to examine your entire colon. The doctor will remove any other polyps that he or she finds, and you will be tested more often. Follow-up care is a key part of your treatment and safety. Be sure to make and go to all appointments, and call your doctor if you are having problems. It's also a good idea to know your test results and keep a list of the medicines you take. How can you care for yourself at home? Regular exams to look for colon polyps are the best way to prevent polyps from turning into colon cancer. These can include stool tests, sigmoidoscopy, colonoscopy, and CT colonography. Talk with your doctor about a testing schedule that is right for you. To prevent polyps  There is no home treatment that can prevent colon polyps. But these steps may help lower your risk for cancer. · Stay active. Being active can help you get to and stay at a healthy weight. Try to exercise on most days of the week. Walking is a good choice. · Eat well. Choose a variety of vegetables, fruits, legumes (such as peas and beans), fish, poultry, and whole grains. · Do not smoke.  If you need help quitting, talk to your doctor about stop-smoking programs and medicines. These can increase your chances of quitting for good. · If you drink alcohol, limit how much you drink. Limit alcohol to 2 drinks a day for men and 1 drink a day for women. When should you call for help? Call your doctor now or seek immediate medical care if:    · You have severe belly pain.     · Your stools are maroon or very bloody. Watch closely for changes in your health, and be sure to contact your doctor if:    · You have a fever.     · You have nausea or vomiting.     · You have a change in bowel habits (new constipation or diarrhea).     · Your symptoms get worse or are not improving as expected. Where can you learn more? Go to http://www.neumann.com/  Enter C571 in the search box to learn more about \"Colon Polyps: Care Instructions. \"  Current as of: December 17, 2020               Content Version: 13.0  © 2835-5029 [employer]. Care instructions adapted under license by [employer] (which disclaims liability or warranty for this information). If you have questions about a medical condition or this instruction, always ask your healthcare professional. [address] any warranty or liability for your use of this information. High-Fiber Diet: Care Instructions  Overview     A high-fiber diet may help you relieve constipation and feel less bloated. Your doctor and dietitian will help you make a high-fiber eating plan based on your personal needs. The plan will include the things you like to eat. It will also make sure that you get 25 to 35 grams of fiber a day. Before you make changes to the way you eat, be sure to talk with your doctor or dietitian. Follow-up care is a key part of your treatment and safety. Be sure to make and go to all appointments, and call your doctor if you are having problems. It's also a good idea to know your test results and keep a list of the medicines you take.   How can you care for yourself at home? · You can increase how much fiber you get if you eat more of certain foods. These foods include:  ? Whole-grain breads and cereals. ? Fruits, such as pears, apples, and peaches. Eat the skins and peels if you can.  ? Vegetables, such as broccoli, cabbage, spinach, carrots, asparagus, and squash. ? Starchy vegetables. These include potatoes with skins, kidney beans, and lima beans. · Take a fiber supplement every day if your doctor recommends it. Examples are Benefiber, Citrucel, FiberCon, and Metamucil. Ask your doctor how much to take. · Drink plenty of fluids. If you have kidney, heart, or liver disease and have to limit fluids, talk with your doctor before you increase the amount of fluids you drink. Where can you learn more? Go to http://www.gray.com/  Enter H144 in the search box to learn more about \"High-Fiber Diet: Care Instructions. \"  Current as of: December 17, 2020               Content Version: 13.0  © 5593-8749 [employer]. Care instructions adapted under license by [employer] (which disclaims liability or warranty for this information). If you have questions about a medical condition or this instruction, always ask your healthcare professional. [address] any warranty or liability for your use of this information.

## 2022-03-18 PROBLEM — M81.0 POST-MENOPAUSAL OSTEOPOROSIS: Status: ACTIVE | Noted: 2017-07-06

## 2022-03-19 PROBLEM — E11.21 TYPE 2 DIABETES WITH NEPHROPATHY (HCC): Status: ACTIVE | Noted: 2019-06-28

## 2022-03-19 PROBLEM — E66.01 OBESITY, MORBID (HCC): Status: ACTIVE | Noted: 2017-12-19

## 2022-03-20 PROBLEM — M25.532 WRIST PAIN, LEFT: Status: ACTIVE | Noted: 2017-03-07

## 2022-03-20 PROBLEM — D75.839 THROMBOCYTOSIS: Status: ACTIVE | Noted: 2018-12-10

## 2022-03-20 PROBLEM — R77.1 HYPERGLOBULINEMIA: Status: ACTIVE | Noted: 2018-12-10

## 2022-03-24 ENCOUNTER — OFFICE VISIT (OUTPATIENT)
Dept: FAMILY MEDICINE CLINIC | Age: 67
End: 2022-03-24
Payer: MEDICARE

## 2022-03-24 VITALS
WEIGHT: 244 LBS | RESPIRATION RATE: 20 BRPM | BODY MASS INDEX: 40.65 KG/M2 | HEIGHT: 65 IN | HEART RATE: 86 BPM | TEMPERATURE: 97 F | SYSTOLIC BLOOD PRESSURE: 132 MMHG | OXYGEN SATURATION: 98 % | DIASTOLIC BLOOD PRESSURE: 70 MMHG

## 2022-03-24 DIAGNOSIS — D47.3 ESSENTIAL (HEMORRHAGIC) THROMBOCYTHEMIA (HCC): ICD-10-CM

## 2022-03-24 DIAGNOSIS — F17.200 CURRENT SMOKER: ICD-10-CM

## 2022-03-24 DIAGNOSIS — E11.21 TYPE 2 DIABETES WITH NEPHROPATHY (HCC): Primary | ICD-10-CM

## 2022-03-24 DIAGNOSIS — Z12.31 ENCOUNTER FOR SCREENING MAMMOGRAM FOR MALIGNANT NEOPLASM OF BREAST: ICD-10-CM

## 2022-03-24 DIAGNOSIS — E66.01 OBESITY, CLASS III, BMI 40-49.9 (MORBID OBESITY) (HCC): ICD-10-CM

## 2022-03-24 DIAGNOSIS — Z01.818 PREOP EXAMINATION: ICD-10-CM

## 2022-03-24 DIAGNOSIS — C50.111 MALIGNANT NEOPLASM OF CENTRAL PORTION OF RIGHT BREAST IN FEMALE, ESTROGEN RECEPTOR POSITIVE (HCC): ICD-10-CM

## 2022-03-24 DIAGNOSIS — E11.9 COMPREHENSIVE DIABETIC FOOT EXAMINATION, TYPE 2 DM, ENCOUNTER FOR (HCC): ICD-10-CM

## 2022-03-24 DIAGNOSIS — Z17.0 MALIGNANT NEOPLASM OF CENTRAL PORTION OF RIGHT BREAST IN FEMALE, ESTROGEN RECEPTOR POSITIVE (HCC): ICD-10-CM

## 2022-03-24 DIAGNOSIS — Z87.891 PERSONAL HISTORY OF TOBACCO USE, PRESENTING HAZARDS TO HEALTH: ICD-10-CM

## 2022-03-24 PROCEDURE — 93005 ELECTROCARDIOGRAM TRACING: CPT | Performed by: NURSE PRACTITIONER

## 2022-03-24 PROCEDURE — 93010 ELECTROCARDIOGRAM REPORT: CPT | Performed by: NURSE PRACTITIONER

## 2022-03-24 PROCEDURE — 99214 OFFICE O/P EST MOD 30 MIN: CPT | Performed by: NURSE PRACTITIONER

## 2022-03-24 NOTE — PROGRESS NOTES
1. \"Have you been to the ER, urgent care clinic since your last visit? Hospitalized since your last visit? \" No    2. \"Have you seen or consulted any other health care providers outside of the [address] [place] [address] since your last visit? \" No     3. For patients aged 39-70: Has the patient had a colonoscopy / FIT/ Cologuard? Yes - no Care Gap present      If the patient is female:    4. For patients aged 41-77: Has the patient had a mammogram within the past 2 years? Yes wants to wait [id number] to schedule      5. For patients aged 21-65: Has the patient had a pap smear?  N/a aged out

## 2022-03-25 LAB
ALBUMIN SERPL-MCNC: 3.9 G/DL (ref 3.5–5)
ALBUMIN/GLOB SERPL: 1.2 {RATIO} (ref 1.1–2.2)
ALP SERPL-CCNC: 118 U/L (ref 45–117)
ALT SERPL-CCNC: 28 U/L (ref 12–78)
ANION GAP SERPL CALC-SCNC: 6 MMOL/L (ref 5–15)
AST SERPL-CCNC: 10 U/L (ref 15–37)
BASOPHILS # BLD: 0.1 K/UL (ref 0–0.1)
BASOPHILS NFR BLD: 1 % (ref 0–1)
BILIRUB SERPL-MCNC: 0.2 MG/DL (ref 0.2–1)
BUN SERPL-MCNC: 17 MG/DL (ref 6–20)
BUN/CREAT SERPL: 26 (ref 12–20)
CALCIUM SERPL-MCNC: 10 MG/DL (ref 8.5–10.1)
CHLORIDE SERPL-SCNC: 102 MMOL/L (ref 97–108)
CHOLEST SERPL-MCNC: 183 MG/DL
CO2 SERPL-SCNC: 29 MMOL/L (ref 21–32)
CREAT SERPL-MCNC: 0.66 MG/DL (ref 0.55–1.02)
DIFFERENTIAL METHOD BLD: ABNORMAL
EOSINOPHIL # BLD: 0.3 K/UL (ref 0–0.4)
EOSINOPHIL NFR BLD: 2 % (ref 0–7)
ERYTHROCYTE [DISTWIDTH] IN BLOOD BY AUTOMATED COUNT: 13.1 % (ref 11.5–14.5)
EST. AVERAGE GLUCOSE BLD GHB EST-MCNC: 157 MG/DL
GLOBULIN SER CALC-MCNC: 3.2 G/DL (ref 2–4)
GLUCOSE SERPL-MCNC: 179 MG/DL (ref 65–100)
HBA1C MFR BLD: 7.1 % (ref 4–5.6)
HCT VFR BLD AUTO: 40.3 % (ref 35–47)
HDLC SERPL-MCNC: 51 MG/DL
HDLC SERPL: 3.6 {RATIO} (ref 0–5)
HGB BLD-MCNC: 12.5 G/DL (ref 11.5–16)
IMM GRANULOCYTES # BLD AUTO: 0.1 K/UL (ref 0–0.04)
IMM GRANULOCYTES NFR BLD AUTO: 1 % (ref 0–0.5)
LDLC SERPL CALC-MCNC: 62.2 MG/DL (ref 0–100)
LYMPHOCYTES # BLD: 3 K/UL (ref 0.8–3.5)
LYMPHOCYTES NFR BLD: 28 % (ref 12–49)
MCH RBC QN AUTO: 28.4 PG (ref 26–34)
MCHC RBC AUTO-ENTMCNC: 31 G/DL (ref 30–36.5)
MCV RBC AUTO: 91.6 FL (ref 80–99)
MONOCYTES # BLD: 0.8 K/UL (ref 0–1)
MONOCYTES NFR BLD: 8 % (ref 5–13)
NEUTS SEG # BLD: 6.6 K/UL (ref 1.8–8)
NEUTS SEG NFR BLD: 60 % (ref 32–75)
NRBC # BLD: 0 K/UL (ref 0–0.01)
NRBC BLD-RTO: 0 PER 100 WBC
PLATELET # BLD AUTO: 437 K/UL (ref 150–400)
PMV BLD AUTO: 9.8 FL (ref 8.9–12.9)
POTASSIUM SERPL-SCNC: 3.9 MMOL/L (ref 3.5–5.1)
PROT SERPL-MCNC: 7.1 G/DL (ref 6.4–8.2)
RBC # BLD AUTO: 4.4 M/UL (ref 3.8–5.2)
SODIUM SERPL-SCNC: 137 MMOL/L (ref 136–145)
TRIGL SERPL-MCNC: 349 MG/DL (ref ?–150)
VLDLC SERPL CALC-MCNC: 69.8 MG/DL
WBC # BLD AUTO: 10.8 K/UL (ref 3.6–11)

## 2022-03-25 NOTE — PROGRESS NOTES
Preoperative Evaluation    Date of Exam: 3/24/2022    Morgan King is a 79 y.o. female (:1955) who presents for preoperative evaluation. Cataract surgery  and  with Dr. Gary Owens. DM; 3 episode of hypoglycemia 70-80 BG se endorses feeling shaky . She treated with glucose tablets . Averge glucose is between 120's to 130's with an occasional 150's. Latex Allergy: no    Problem List:     Patient Active Problem List    Diagnosis Date Noted    Type 2 diabetes with nephropathy ([address] 2019    Essential (hemorrhagic) thrombocythemia ([address] 12/10/2018    Hyperglobulinemia 12/10/2018    Obesity, morbid ([address] 2017    Post-menopausal osteoporosis 2017    Wrist pain, left 2017    Use of exemestane (Aromasin) 2016    Fatigue due to treatment 2016    Use of letrozole (Femara) 2015    Osteopenia 2015    Osteoarthritis 2015    Type 2 diabetes mellitus without complication, without long-term current use of insulin ([facility] 2013    Malignant neoplasm of central portion of right breast in female, estrogen receptor positive ([facility] 2013    Mixed hyperlipidemia 2013    Obesity, unspecified 2013    Anxiety state, unspecified 2013     Medical History:     Past Medical History:   Diagnosis Date    Anxiety state, unspecified 2013    Malignant neoplasm of breast (female), unspecified site 2013    Right Breast    Menopause     Obesity, unspecified 2013    Other and unspecified hyperlipidemia 2013    Radiation therapy complication     Type II or unspecified type diabetes mellitus without mention of complication, uncontrolled 2013     Allergies: Allergies   Allergen Reactions    Ace Inhibitors Cough    Sulfa (Sulfonamide Antibiotics) Rash      Medications:     Current Outpatient Medications   Medication Sig    psyllium (METAMUCIL) pack (sugar free) packet Take 1 Packet by mouth daily.     atorvastatin (LIPITOR) 40 mg tablet Take 1 tablet by mouth once daily    metFORMIN (GLUCOPHAGE) 1,000 mg tablet TAKE 1 TABLET BY MOUTH TWICE DAILY WITH MEALS    FLUoxetine (PROzac) 20 mg capsule Take 1 capsule by mouth once daily    glimepiride (AMARYL) 4 mg tablet TAKE 1 TABLET BY MOUTH ONCE DAILY IN THE MORNING    calcium carbonate/vitamin D3 (CALCIUM+D PO) Take  by mouth.  FAMOTIDINE PO Take 1 Tab by mouth daily.  aspirin delayed-release 81 mg tablet Take 1 Tab by mouth daily. Indications: treatment to prevent a heart attack    multivit-min/iron/folic/lutein (CENTRUM SILVER WOMEN PO) Take  by mouth.  denosumab (PROLIA) 60 mg/mL injection 60 mg by SubCUTAneous route. g 6 months (Patient not taking: Reported on 3/24/2022)     No current facility-administered medications for this visit. Surgical History:     Past Surgical History:   Procedure Laterality Date    COLONOSCOPY N/A 2/24/2022    COLONOSCOPY WITH POLYPECTOMY performed by Robby Lomax MD at [address] [place] [address] [address] HX BREAST LUMPECTOMY  Feb 2013    HX BREAST LUMPECTOMY  March 2013    Revision    TX CHEST SURGERY PROCEDURE UNLISTED  April 2013    [facility]     Social History:     Social History     Socioeconomic History    Marital status:    Tobacco Use    Smoking status: Current Some Day Smoker     Packs/day: 0.50     Years: 50.00     Pack years: 25.00     Types: Cigarettes    Smokeless tobacco: Never Used   Vaping Use    Vaping Use: Never used   Substance and Sexual Activity    Alcohol use: Yes     Comment: Occasional    Drug use: No   Other Topics Concern     Service No    Blood Transfusions No    Caffeine Concern No    Occupational Exposure Yes     Comment: Works with [employer]     Hobby Hazards Yes    Sleep Concern No    Stress Concern No    Special Diet No     Comment: Diabetic    Back Care Yes    Exercise Yes    Seat Belt Yes    Self-Exams Yes       Anesthesia Complications: None  History of abnormal bleeding : None  History of Blood Transfusions: no  Health Care Directive or Living Will: yes    Objective:     ROS:   Feeling well. No dyspnea or chest pain on exertion. No abdominal pain, change in bowel habits, black or bloody stools. No urinary tract symptoms. GYN ROS: no breast pain or new or enlarging lumps on self exam, no vaginal bleeding, no discharge or pelvic pain. No neurological complaints. OBJECTIVE:   The patient appears well, alert, oriented x 3, in no distress. Visit Vitals  /70 (BP 1 Location: Left upper arm, BP Patient Position: Sitting, BP Cuff Size: Large adult)   Pulse 86   Temp 97 °F (36.1 °C) (Temporal)   Resp 20   Ht 5' 5\" (1.651 m)   Wt 244 lb (110.7 kg)   SpO2 98%   BMI 40.60 kg/m²     HEENT:ENT normal.  Neck supple. No adenopathy or thyromegaly. CORNELIA. Chest: Lungs are clear, good air entry, no wheezes, rhonchi or rales. Cardiovascular: S1 and S2 normal, no murmurs, regular rate and rhythm. Abdomen: soft without tenderness, guarding, mass or organomegaly. Extremities: show no edema, normal peripheral pulses. Neurological: is normal, no focal findings. BREAST EXAM: not examined    PELVIC EXAM: examination not indicated         Diabetic foot exam performed by Brittany Nevarez NP     Measurement  Response Nurse Comment Physician Comment   Monofilament  R - normal sensation with micro filament  L - normal sensation with micro filament     Pulse DP R - present  L - present     Pulse TP R - present  L - present     Structural deformity R - None  L - None     Skin Integrity / Deformity R - None  L - None        Reviewed by:           DIAGNOSTICS:   1. EKG: EKG FINDINGS - PAC's noted, sinus rhythm,  . Old anterior infarct  Signs of pulmonary disease. 2. CXR: pending  3. Labs: pending    IMPRESSION:   Diabetes mellitus has a history of hypoglycemia . Close monitoring encouraged.   Low risk for planned surgery  No contraindications to planned surgery  Pending preop testing     Emi Parsons NP   3/24/2022        Discussed with the patient the current USPSTF guidelines released March 9, 2021 for screening for lung cancer. For adults aged 48 to [de-identified] years who have a 20 pack-year smoking history and currently smoke or have quit within the past 15 years the grade B recommendation is to:  Screen for lung cancer with low-dose computed tomography (LDCT) every year. Stop screening once a person has not smoked for 15 years or has a health problem that limits life expectancy or the ability to have lung surgery. The patient has a 50 pack-year history of cigarette smoking and currently is 0.5 pack per day. Discussed with patient the risks and benefits of screening, including over-diagnosis, false positive rate, and total radiation exposure. The patient currently exhibits no signs or symptoms suggestive of lung cancer. Discussed with patient the importance of compliance with yearly annual lung cancer screenings and willingness to undergo diagnosis and treatment if screening scan is positive. In addition, the patient was counseled regarding the importance of remaining smoke free and/or total smoking cessation.     Also reviewed the following if the patient has Medicare that as of February 10, 2022, Medicare only covers LDCT screening in patients aged 51-72 with at least a 20 pack-year smoking history who currently smoke or have quit in the last 15 years    María RIVERA

## 2022-03-28 RX ORDER — ATORVASTATIN CALCIUM 80 MG/1
80 TABLET, FILM COATED ORAL DAILY
Qty: 90 TABLET | Refills: 1 | Status: SHIPPED | OUTPATIENT
Start: 2022-03-28 | End: 2022-09-09

## 2022-03-28 NOTE — PROGRESS NOTES
Sp/w pt, advised of results and recommendations, pt expressed understanding. KT    Would like new dose of Atorvastatin 80mg sent to [facility]. She is going to increase her ASA to 325mg per day.  KT

## 2022-03-28 NOTE — PROGRESS NOTES
Preop labs  and call patient   HGBA1C  stable at 7.1  Metabolic panel stable   Area to address is cholesterol and triglycerides are elevated recommend increasing atorvastatin to 80 mg . Which pharmacy  CBC no anemia however platelets are going up consider increasing ASA to 325 mg per day .

## 2022-06-02 DIAGNOSIS — E11.9 TYPE 2 DIABETES MELLITUS WITHOUT COMPLICATION, WITHOUT LONG-TERM CURRENT USE OF INSULIN (HCC): ICD-10-CM

## 2022-06-02 RX ORDER — FLUOXETINE HYDROCHLORIDE 20 MG/1
CAPSULE ORAL
Qty: 90 CAPSULE | Refills: 0 | Status: SHIPPED | OUTPATIENT
Start: 2022-06-02 | End: 2022-09-09

## 2022-06-02 RX ORDER — GLIMEPIRIDE 4 MG/1
TABLET ORAL
Qty: 90 TABLET | Refills: 0 | Status: SHIPPED | OUTPATIENT
Start: 2022-06-02 | End: 2022-09-09

## 2022-06-02 RX ORDER — METFORMIN HYDROCHLORIDE 1000 MG/1
TABLET ORAL
Qty: 180 TABLET | Refills: 0 | Status: SHIPPED | OUTPATIENT
Start: 2022-06-02 | End: 2022-09-09

## 2022-09-09 DIAGNOSIS — E11.9 TYPE 2 DIABETES MELLITUS WITHOUT COMPLICATION, WITHOUT LONG-TERM CURRENT USE OF INSULIN (HCC): ICD-10-CM

## 2022-09-09 RX ORDER — METFORMIN HYDROCHLORIDE 1000 MG/1
TABLET ORAL
Qty: 180 TABLET | Refills: 0 | Status: SHIPPED | OUTPATIENT
Start: 2022-09-09

## 2022-09-09 RX ORDER — GLIMEPIRIDE 4 MG/1
TABLET ORAL
Qty: 90 TABLET | Refills: 0 | Status: SHIPPED | OUTPATIENT
Start: 2022-09-09

## 2022-09-09 RX ORDER — FLUOXETINE HYDROCHLORIDE 20 MG/1
CAPSULE ORAL
Qty: 90 CAPSULE | Refills: 0 | Status: SHIPPED | OUTPATIENT
Start: 2022-09-09

## 2022-09-09 RX ORDER — ATORVASTATIN CALCIUM 80 MG/1
TABLET, FILM COATED ORAL
Qty: 90 TABLET | Refills: 0 | Status: SHIPPED | OUTPATIENT
Start: 2022-09-09

## 2022-12-16 DIAGNOSIS — E11.9 TYPE 2 DIABETES MELLITUS WITHOUT COMPLICATION, WITHOUT LONG-TERM CURRENT USE OF INSULIN (HCC): ICD-10-CM

## 2022-12-16 RX ORDER — METFORMIN HYDROCHLORIDE 1000 MG/1
TABLET ORAL
Qty: 180 TABLET | Refills: 0 | Status: SHIPPED | OUTPATIENT
Start: 2022-12-16

## 2022-12-16 RX ORDER — GLIMEPIRIDE 4 MG/1
TABLET ORAL
Qty: 90 TABLET | Refills: 0 | Status: SHIPPED | OUTPATIENT
Start: 2022-12-16

## 2022-12-16 RX ORDER — ATORVASTATIN CALCIUM 80 MG/1
TABLET, FILM COATED ORAL
Qty: 90 TABLET | Refills: 0 | Status: SHIPPED | OUTPATIENT
Start: 2022-12-16

## 2022-12-16 RX ORDER — FLUOXETINE HYDROCHLORIDE 20 MG/1
CAPSULE ORAL
Qty: 90 CAPSULE | Refills: 0 | Status: SHIPPED | OUTPATIENT
Start: 2022-12-16

## 2023-01-06 ENCOUNTER — HOSPITAL ENCOUNTER (OUTPATIENT)
Dept: MAMMOGRAPHY | Age: 68
Discharge: HOME | End: 2023-01-06
Attending: NURSE PRACTITIONER
Payer: MEDICARE

## 2023-01-06 VITALS — BODY MASS INDEX: 40.6 KG/M2 | WEIGHT: 244 LBS

## 2023-01-06 DIAGNOSIS — Z12.31 ENCOUNTER FOR SCREENING MAMMOGRAM FOR MALIGNANT NEOPLASM OF BREAST: ICD-10-CM

## 2023-01-06 PROCEDURE — 77063 BREAST TOMOSYNTHESIS BI: CPT

## 2023-01-09 NOTE — PROGRESS NOTES
Patient verified by stating name and date of birth.  Patient informed of Mammo results and states understanding per Claudia Akbar

## 2023-03-22 DIAGNOSIS — E11.9 TYPE 2 DIABETES MELLITUS WITHOUT COMPLICATION, WITHOUT LONG-TERM CURRENT USE OF INSULIN (HCC): ICD-10-CM

## 2023-03-23 RX ORDER — GLIMEPIRIDE 4 MG/1
4 TABLET ORAL DAILY
Qty: 90 TABLET | Refills: 1 | Status: SHIPPED | OUTPATIENT
Start: 2023-03-23

## 2023-03-23 RX ORDER — ATORVASTATIN CALCIUM 80 MG/1
80 TABLET, FILM COATED ORAL DAILY
Qty: 90 TABLET | Refills: 0 | Status: SHIPPED | OUTPATIENT
Start: 2023-03-23

## 2023-03-23 RX ORDER — METFORMIN HYDROCHLORIDE 1000 MG/1
1000 TABLET ORAL 2 TIMES DAILY WITH MEALS
Qty: 180 TABLET | Refills: 0 | Status: SHIPPED | OUTPATIENT
Start: 2023-03-23

## 2023-07-03 NOTE — TELEPHONE ENCOUNTER
Last OV 3/24/2022  Requested Prescriptions     Pending Prescriptions Disp Refills    atorvastatin (LIPITOR) 80 MG tablet [Pharmacy Med Name: Atorvastatin Calcium 80 MG Oral Tablet] 90 tablet 0     Sig: Take 1 tablet by mouth once daily    metFORMIN (GLUCOPHAGE) 1000 MG tablet [Pharmacy Med Name: metFORMIN HCl 1000 MG Oral Tablet] 180 tablet 0     Sig: TAKE 1 TABLET BY MOUTH TWICE DAILY WITH MEALS       Sent to front staff to schedule an appointment.

## 2023-07-03 NOTE — TELEPHONE ENCOUNTER
Last OV 03/24/2022. Requested Prescriptions     Pending Prescriptions Disp Refills    FLUoxetine (PROZAC) 20 MG capsule [Pharmacy Med Name: FLUoxetine HCl 20 MG Oral Capsule] 90 capsule 0     Sig: Take 1 capsule by mouth once daily         Sent also to front staff to schedule an appointment.

## 2023-07-04 RX ORDER — ATORVASTATIN CALCIUM 80 MG/1
TABLET, FILM COATED ORAL
Qty: 90 TABLET | Refills: 0 | OUTPATIENT
Start: 2023-07-04

## 2023-07-04 RX ORDER — FLUOXETINE HYDROCHLORIDE 20 MG/1
CAPSULE ORAL
Qty: 90 CAPSULE | Refills: 0 | Status: SHIPPED | OUTPATIENT
Start: 2023-07-04 | End: 2023-07-04

## 2023-07-06 NOTE — TELEPHONE ENCOUNTER
Medication Refill Request    Sushma Mason is requesting a refill of the following medication(s):   metFORMIN (GLUCOPHAGE) 1000 MG tablet  Please send refill to:      [address], [address] [phone] [place] [phone]  [facility] [address]  Phone: [phone] Fax: [phone]     Tusharjorge luis Kaiser has an appt with Norma Hewitt 7-11, but wants to know if she can get enough of the RX to last until then since she is completely out

## 2023-07-10 SDOH — ECONOMIC STABILITY: INCOME INSECURITY: HOW HARD IS IT FOR YOU TO PAY FOR THE VERY BASICS LIKE FOOD, HOUSING, MEDICAL CARE, AND HEATING?: SOMEWHAT HARD

## 2023-07-10 SDOH — ECONOMIC STABILITY: HOUSING INSECURITY
IN THE LAST 12 MONTHS, WAS THERE A TIME WHEN YOU DID NOT HAVE A STEADY PLACE TO SLEEP OR SLEPT IN A SHELTER (INCLUDING NOW)?: NO

## 2023-07-10 SDOH — ECONOMIC STABILITY: TRANSPORTATION INSECURITY
IN THE PAST 12 MONTHS, HAS LACK OF TRANSPORTATION KEPT YOU FROM MEETINGS, WORK, OR FROM GETTING THINGS NEEDED FOR DAILY LIVING?: NO

## 2023-07-10 SDOH — ECONOMIC STABILITY: FOOD INSECURITY: WITHIN THE PAST 12 MONTHS, YOU WORRIED THAT YOUR FOOD WOULD RUN OUT BEFORE YOU GOT MONEY TO BUY MORE.: PATIENT DECLINED

## 2023-07-10 SDOH — ECONOMIC STABILITY: FOOD INSECURITY: WITHIN THE PAST 12 MONTHS, THE FOOD YOU BOUGHT JUST DIDN'T LAST AND YOU DIDN'T HAVE MONEY TO GET MORE.: PATIENT DECLINED

## 2023-07-11 ENCOUNTER — OFFICE VISIT (OUTPATIENT)
Age: 68
End: 2023-07-11
Payer: MEDICARE

## 2023-07-11 VITALS
OXYGEN SATURATION: 97 % | WEIGHT: 244 LBS | RESPIRATION RATE: 20 BRPM | BODY MASS INDEX: 41.66 KG/M2 | DIASTOLIC BLOOD PRESSURE: 62 MMHG | SYSTOLIC BLOOD PRESSURE: 134 MMHG | TEMPERATURE: 97 F | HEART RATE: 92 BPM | HEIGHT: 64 IN

## 2023-07-11 DIAGNOSIS — D47.3 ESSENTIAL (HEMORRHAGIC) THROMBOCYTHEMIA (HCC): ICD-10-CM

## 2023-07-11 DIAGNOSIS — E78.2 MIXED HYPERLIPIDEMIA: ICD-10-CM

## 2023-07-11 DIAGNOSIS — E11.9 COMPREHENSIVE DIABETIC FOOT EXAMINATION, TYPE 2 DM, ENCOUNTER FOR (HCC): ICD-10-CM

## 2023-07-11 DIAGNOSIS — E11.21 TYPE 2 DIABETES MELLITUS WITH DIABETIC NEPHROPATHY, WITHOUT LONG-TERM CURRENT USE OF INSULIN (HCC): Primary | ICD-10-CM

## 2023-07-11 LAB
ALBUMIN, URINE, POC: 10 MG/L
CREATININE, URINE, POC: 100 MG/DL
MICROALB/CREAT RATIO, POC: <30 MG/G

## 2023-07-11 PROCEDURE — 3046F HEMOGLOBIN A1C LEVEL >9.0%: CPT | Performed by: NURSE PRACTITIONER

## 2023-07-11 PROCEDURE — G0439 PPPS, SUBSEQ VISIT: HCPCS | Performed by: NURSE PRACTITIONER

## 2023-07-11 PROCEDURE — 36415 COLL VENOUS BLD VENIPUNCTURE: CPT | Performed by: NURSE PRACTITIONER

## 2023-07-11 PROCEDURE — 3017F COLORECTAL CA SCREEN DOC REV: CPT | Performed by: NURSE PRACTITIONER

## 2023-07-11 PROCEDURE — 82044 UR ALBUMIN SEMIQUANTITATIVE: CPT | Performed by: NURSE PRACTITIONER

## 2023-07-11 PROCEDURE — 1123F ACP DISCUSS/DSCN MKR DOCD: CPT | Performed by: NURSE PRACTITIONER

## 2023-07-11 RX ORDER — ASPIRIN/CALCIUM/MAG/ALUMINUM 325 MG
TABLET ORAL
COMMUNITY
End: 2023-07-11

## 2023-07-11 RX ORDER — FLUOXETINE 20 MG/1
20 TABLET, FILM COATED ORAL DAILY
Qty: 90 TABLET | Refills: 3 | Status: SHIPPED | OUTPATIENT
Start: 2023-07-11

## 2023-07-11 RX ORDER — GLIMEPIRIDE 4 MG/1
4 TABLET ORAL DAILY
Qty: 90 TABLET | Refills: 3 | Status: SHIPPED | OUTPATIENT
Start: 2023-07-11

## 2023-07-11 RX ORDER — ASPIRIN 325 MG
325 TABLET ORAL DAILY
COMMUNITY

## 2023-07-11 RX ORDER — ATORVASTATIN CALCIUM 80 MG/1
80 TABLET, FILM COATED ORAL DAILY
Qty: 90 TABLET | Refills: 3 | Status: SHIPPED | OUTPATIENT
Start: 2023-07-11

## 2023-07-11 SDOH — HEALTH STABILITY: PHYSICAL HEALTH: ON AVERAGE, HOW MANY MINUTES DO YOU ENGAGE IN EXERCISE AT THIS LEVEL?: 150+ MIN

## 2023-07-11 SDOH — ECONOMIC STABILITY: INCOME INSECURITY: IN THE LAST 12 MONTHS, WAS THERE A TIME WHEN YOU WERE NOT ABLE TO PAY THE MORTGAGE OR RENT ON TIME?: NO

## 2023-07-11 SDOH — ECONOMIC STABILITY: TRANSPORTATION INSECURITY
IN THE PAST 12 MONTHS, HAS THE LACK OF TRANSPORTATION KEPT YOU FROM MEDICAL APPOINTMENTS OR FROM GETTING MEDICATIONS?: NO

## 2023-07-11 SDOH — HEALTH STABILITY: PHYSICAL HEALTH: ON AVERAGE, HOW MANY DAYS PER WEEK DO YOU ENGAGE IN MODERATE TO STRENUOUS EXERCISE (LIKE A BRISK WALK)?: 2 DAYS

## 2023-07-11 SDOH — ECONOMIC STABILITY: FOOD INSECURITY: WITHIN THE PAST 12 MONTHS, THE FOOD YOU BOUGHT JUST DIDN'T LAST AND YOU DIDN'T HAVE MONEY TO GET MORE.: NEVER TRUE

## 2023-07-11 SDOH — ECONOMIC STABILITY: FOOD INSECURITY: WITHIN THE PAST 12 MONTHS, YOU WORRIED THAT YOUR FOOD WOULD RUN OUT BEFORE YOU GOT MONEY TO BUY MORE.: NEVER TRUE

## 2023-07-11 SDOH — ECONOMIC STABILITY: HOUSING INSECURITY: IN THE LAST 12 MONTHS, HOW MANY PLACES HAVE YOU LIVED?: 1

## 2023-07-11 ASSESSMENT — SOCIAL DETERMINANTS OF HEALTH (SDOH)
HOW OFTEN DO YOU ATTEND CHURCH OR RELIGIOUS SERVICES?: MORE THAN 4 TIMES PER YEAR
WITHIN THE LAST YEAR, HAVE YOU BEEN AFRAID OF YOUR PARTNER OR EX-PARTNER?: NO
WITHIN THE LAST YEAR, HAVE YOU BEEN HUMILIATED OR EMOTIONALLY ABUSED IN OTHER WAYS BY YOUR PARTNER OR EX-PARTNER?: NO
WITHIN THE LAST YEAR, HAVE TO BEEN RAPED OR FORCED TO HAVE ANY KIND OF SEXUAL ACTIVITY BY YOUR PARTNER OR EX-PARTNER?: NO
IN A TYPICAL WEEK, HOW MANY TIMES DO YOU TALK ON THE PHONE WITH FAMILY, FRIENDS, OR NEIGHBORS?: TWICE A WEEK
WITHIN THE LAST YEAR, HAVE YOU BEEN KICKED, HIT, SLAPPED, OR OTHERWISE PHYSICALLY HURT BY YOUR PARTNER OR EX-PARTNER?: NO
HOW OFTEN DO YOU ATTENT MEETINGS OF THE CLUB OR ORGANIZATION YOU BELONG TO?: NEVER
HOW OFTEN DO YOU GET TOGETHER WITH FRIENDS OR RELATIVES?: TWICE A WEEK
DO YOU BELONG TO ANY CLUBS OR ORGANIZATIONS SUCH AS CHURCH GROUPS UNIONS, FRATERNAL OR ATHLETIC GROUPS, OR SCHOOL GROUPS?: NO
HOW HARD IS IT FOR YOU TO PAY FOR THE VERY BASICS LIKE FOOD, HOUSING, MEDICAL CARE, AND HEATING?: SOMEWHAT HARD

## 2023-07-11 ASSESSMENT — PATIENT HEALTH QUESTIONNAIRE - PHQ9
10. IF YOU CHECKED OFF ANY PROBLEMS, HOW DIFFICULT HAVE THESE PROBLEMS MADE IT FOR YOU TO DO YOUR WORK, TAKE CARE OF THINGS AT HOME, OR GET ALONG WITH OTHER PEOPLE: NOT DIFFICULT AT ALL
1. LITTLE INTEREST OR PLEASURE IN DOING THINGS: 0
SUM OF ALL RESPONSES TO PHQ9 QUESTIONS 1 & 2: 0
2. FEELING DOWN, DEPRESSED OR HOPELESS: SEVERAL DAYS
SUM OF ALL RESPONSES TO PHQ QUESTIONS 1-9: 0
2. FEELING DOWN, DEPRESSED OR HOPELESS: 0
1. LITTLE INTEREST OR PLEASURE IN DOING THINGS: SEVERAL DAYS
SUM OF ALL RESPONSES TO PHQ QUESTIONS 1-9: 0
SUM OF ALL RESPONSES TO PHQ9 QUESTIONS 1 & 2: 2
SUM OF ALL RESPONSES TO PHQ QUESTIONS 1-9: 0
SUM OF ALL RESPONSES TO PHQ QUESTIONS 1-9: 0

## 2023-07-11 ASSESSMENT — LIFESTYLE VARIABLES
HOW MANY STANDARD DRINKS CONTAINING ALCOHOL DO YOU HAVE ON A TYPICAL DAY: 1 OR 2
HOW OFTEN DO YOU HAVE A DRINK CONTAINING ALCOHOL: MONTHLY OR LESS

## 2023-07-12 LAB
ALBUMIN SERPL-MCNC: 3.7 G/DL (ref 3.5–5)
ALBUMIN/GLOB SERPL: 1.1 (ref 1.1–2.2)
ALP SERPL-CCNC: 114 U/L (ref 45–117)
ALT SERPL-CCNC: 30 U/L (ref 12–78)
ANION GAP SERPL CALC-SCNC: 6 MMOL/L (ref 5–15)
AST SERPL-CCNC: 16 U/L (ref 15–37)
BASOPHILS # BLD: 0.1 K/UL (ref 0–0.1)
BASOPHILS NFR BLD: 1 % (ref 0–1)
BILIRUB SERPL-MCNC: 0.2 MG/DL (ref 0.2–1)
BUN SERPL-MCNC: 15 MG/DL (ref 6–20)
BUN/CREAT SERPL: 24 (ref 12–20)
CALCIUM SERPL-MCNC: 9.8 MG/DL (ref 8.5–10.1)
CHLORIDE SERPL-SCNC: 105 MMOL/L (ref 97–108)
CHOLEST SERPL-MCNC: 184 MG/DL
CO2 SERPL-SCNC: 28 MMOL/L (ref 21–32)
CREAT SERPL-MCNC: 0.63 MG/DL (ref 0.55–1.02)
DIFFERENTIAL METHOD BLD: ABNORMAL
EOSINOPHIL # BLD: 0.2 K/UL (ref 0–0.4)
EOSINOPHIL NFR BLD: 2 % (ref 0–7)
ERYTHROCYTE [DISTWIDTH] IN BLOOD BY AUTOMATED COUNT: 13.8 % (ref 11.5–14.5)
EST. AVERAGE GLUCOSE BLD GHB EST-MCNC: 157 MG/DL
GLOBULIN SER CALC-MCNC: 3.4 G/DL (ref 2–4)
GLUCOSE SERPL-MCNC: 188 MG/DL (ref 65–100)
HBA1C MFR BLD: 7.1 % (ref 4–5.6)
HCT VFR BLD AUTO: 42.9 % (ref 35–47)
HDLC SERPL-MCNC: 51 MG/DL
HDLC SERPL: 3.6 (ref 0–5)
HGB BLD-MCNC: 13 G/DL (ref 11.5–16)
IMM GRANULOCYTES # BLD AUTO: 0 K/UL (ref 0–0.04)
IMM GRANULOCYTES NFR BLD AUTO: 0 % (ref 0–0.5)
LDLC SERPL CALC-MCNC: 79.8 MG/DL (ref 0–100)
LYMPHOCYTES # BLD: 2.6 K/UL (ref 0.8–3.5)
LYMPHOCYTES NFR BLD: 27 % (ref 12–49)
MCH RBC QN AUTO: 28.1 PG (ref 26–34)
MCHC RBC AUTO-ENTMCNC: 30.3 G/DL (ref 30–36.5)
MCV RBC AUTO: 92.7 FL (ref 80–99)
MONOCYTES # BLD: 0.8 K/UL (ref 0–1)
MONOCYTES NFR BLD: 8 % (ref 5–13)
NEUTS SEG # BLD: 6 K/UL (ref 1.8–8)
NEUTS SEG NFR BLD: 62 % (ref 32–75)
NRBC # BLD: 0 K/UL (ref 0–0.01)
NRBC BLD-RTO: 0 PER 100 WBC
PLATELET # BLD AUTO: 408 K/UL (ref 150–400)
PMV BLD AUTO: 10 FL (ref 8.9–12.9)
POTASSIUM SERPL-SCNC: 3.9 MMOL/L (ref 3.5–5.1)
PROT SERPL-MCNC: 7.1 G/DL (ref 6.4–8.2)
RBC # BLD AUTO: 4.63 M/UL (ref 3.8–5.2)
SODIUM SERPL-SCNC: 139 MMOL/L (ref 136–145)
TRIGL SERPL-MCNC: 266 MG/DL
VLDLC SERPL CALC-MCNC: 53.2 MG/DL
WBC # BLD AUTO: 9.5 K/UL (ref 3.6–11)

## 2023-10-10 ENCOUNTER — HOSPITAL ENCOUNTER (EMERGENCY)
Facility: HOSPITAL | Age: 68
Discharge: HOME | End: 2023-10-10
Attending: FAMILY MEDICINE | Admitting: FAMILY MEDICINE
Payer: MEDICARE

## 2023-10-10 ENCOUNTER — APPOINTMENT (OUTPATIENT)
Facility: HOSPITAL | Age: 68
End: 2023-10-10
Payer: MEDICARE

## 2023-10-10 VITALS
RESPIRATION RATE: 16 BRPM | HEART RATE: 84 BPM | OXYGEN SATURATION: 95 % | TEMPERATURE: 98.5 F | DIASTOLIC BLOOD PRESSURE: 104 MMHG | SYSTOLIC BLOOD PRESSURE: 128 MMHG | BODY MASS INDEX: 40.48 KG/M2 | HEIGHT: 65 IN | WEIGHT: 243 LBS

## 2023-10-10 DIAGNOSIS — S62.101A CLOSED FRACTURE OF RIGHT WRIST, INITIAL ENCOUNTER: Primary | ICD-10-CM

## 2023-10-10 PROCEDURE — 90715 TDAP VACCINE 7 YRS/> IM: CPT | Performed by: FAMILY MEDICINE

## 2023-10-10 PROCEDURE — 6370000000 HC RX 637 (ALT 250 FOR IP): Performed by: FAMILY MEDICINE

## 2023-10-10 PROCEDURE — 73110 X-RAY EXAM OF WRIST: CPT

## 2023-10-10 PROCEDURE — 90471 IMMUNIZATION ADMIN: CPT | Performed by: FAMILY MEDICINE

## 2023-10-10 PROCEDURE — 99284 EMERGENCY DEPT VISIT MOD MDM: CPT

## 2023-10-10 PROCEDURE — 6360000002 HC RX W HCPCS: Performed by: FAMILY MEDICINE

## 2023-10-10 RX ORDER — IBUPROFEN 600 MG/1
600 TABLET ORAL
Status: COMPLETED | OUTPATIENT
Start: 2023-10-10 | End: 2023-10-10

## 2023-10-10 RX ORDER — HYDROCODONE BITARTRATE AND ACETAMINOPHEN 5; 325 MG/1; MG/1
1 TABLET ORAL EVERY 6 HOURS PRN
Qty: 12 TABLET | Refills: 0 | Status: SHIPPED | OUTPATIENT
Start: 2023-10-10 | End: 2023-10-13

## 2023-10-10 RX ORDER — ONDANSETRON 2 MG/ML
4 INJECTION INTRAMUSCULAR; INTRAVENOUS ONCE
Status: DISCONTINUED | OUTPATIENT
Start: 2023-10-10 | End: 2023-10-10

## 2023-10-10 RX ORDER — BACITRACIN ZINC 500 [USP'U]/G
OINTMENT TOPICAL 2 TIMES DAILY
Status: DISCONTINUED | OUTPATIENT
Start: 2023-10-10 | End: 2023-10-10 | Stop reason: HOSPADM

## 2023-10-10 RX ORDER — KETOROLAC TROMETHAMINE 15 MG/ML
15 INJECTION, SOLUTION INTRAMUSCULAR; INTRAVENOUS
Status: DISCONTINUED | OUTPATIENT
Start: 2023-10-10 | End: 2023-10-10

## 2023-10-10 RX ADMIN — BACITRACIN ZINC: 500 OINTMENT TOPICAL at 14:43

## 2023-10-10 RX ADMIN — IBUPROFEN 600 MG: 600 TABLET ORAL at 14:20

## 2023-10-10 RX ADMIN — TETANUS TOXOID, REDUCED DIPHTHERIA TOXOID AND ACELLULAR PERTUSSIS VACCINE, ADSORBED 0.5 ML: 5; 2.5; 8; 8; 2.5 SUSPENSION INTRAMUSCULAR at 14:44

## 2023-10-10 ASSESSMENT — PATIENT HEALTH QUESTIONNAIRE - PHQ9
SUM OF ALL RESPONSES TO PHQ QUESTIONS 1-9: 0
1. LITTLE INTEREST OR PLEASURE IN DOING THINGS: 0
2. FEELING DOWN, DEPRESSED OR HOPELESS: 0
SUM OF ALL RESPONSES TO PHQ9 QUESTIONS 1 & 2: 0
SUM OF ALL RESPONSES TO PHQ QUESTIONS 1-9: 0

## 2023-10-10 ASSESSMENT — PAIN DESCRIPTION - ORIENTATION
ORIENTATION: RIGHT
ORIENTATION: RIGHT

## 2023-10-10 ASSESSMENT — LIFESTYLE VARIABLES
HOW MANY STANDARD DRINKS CONTAINING ALCOHOL DO YOU HAVE ON A TYPICAL DAY: PATIENT DOES NOT DRINK
HOW OFTEN DO YOU HAVE A DRINK CONTAINING ALCOHOL: NEVER

## 2023-10-10 ASSESSMENT — PAIN - FUNCTIONAL ASSESSMENT
PAIN_FUNCTIONAL_ASSESSMENT: 0-10
PAIN_FUNCTIONAL_ASSESSMENT: 0-10

## 2023-10-10 ASSESSMENT — PAIN SCALES - GENERAL
PAINLEVEL_OUTOF10: 8
PAINLEVEL_OUTOF10: 8
PAINLEVEL_OUTOF10: 4

## 2023-10-10 ASSESSMENT — PAIN DESCRIPTION - LOCATION
LOCATION: WRIST
LOCATION: ARM

## 2023-10-10 NOTE — ED NOTES
I have reviewed discharge instructions with the patient. The patient verbalized understanding. Discharge medications discussed with patient. No questions at this time. Ambulated without difficulty.       Zoey Sauer RN  10/10/23 5900

## 2023-10-10 NOTE — ED NOTES
Patient's TD statu was in 2016 per her chart. Would prefer to update it in the doctors office.       Sheila Spivey RN  10/10/23 9517

## 2023-10-10 NOTE — ED TRIAGE NOTES
Pt reports a fall with right wrist injury at approx 1300. Pt is unsure of how she fell, and denies LOC, is not on thinners.

## 2023-10-10 NOTE — ED NOTES
Patient educated on medications to be administered. Verified  Allergies. Pain medication administered as ordered. Bed rails up and call bell within reach.       Ana Mahajan RN  10/10/23 8190

## 2023-10-10 NOTE — DISCHARGE INSTRUCTIONS
--Ibuprofen 400 mg every 6 hours as needed for pain. Take with food. --Norco 1 tab every 6 hours as needed for pain.

## 2023-10-10 NOTE — ED NOTES
Patient's hand abrasion cleaned with wound  an has bacitracin and a band aid applied     Karenann Osgood, RN  10/10/23 7599

## 2023-10-11 NOTE — ED PROVIDER NOTES
Clinic Care Coordination Contact  Follow Up Progress Note      Assessment:  received a call from patient.    Patient has been walking with his walker some. He wants to know if he can get a gym membership. [facility] encouraged him to call his Med[employer] and Medicare plans to see if he can get that covered and where he could go. He said he would. He said his [facility] staff could accompany him and assist him If he went.    Patient wanted to know when his next podiatry appt is.  called [facility] in [place] and his next appt is September 26th at 1:15pm. He has podiatry appts routine every 9 weeks.    Patient said he got partial dentures put in and he has follow up to get the rest put in.    Patient was happy today and said he is feeling better.    He had an appt with Dr. Rene and his next appt is in October    [facility] can continue to follow    He has been touring AL's and working with the [facility]  on that.       Christine Rutledge, MSW, [facility]  Clinic Care Coordinator  [email or web address]  [phone]    tablet     atorvastatin 80 MG tablet  Commonly known as: LIPITOR  Take 1 tablet by mouth daily     FAMOTIDINE PO     FLUoxetine 20 MG tablet  Commonly known as: PROZAC  Take 1 tablet by mouth daily     glimepiride 4 MG tablet  Commonly known as: AMARYL  Take 1 tablet by mouth daily     metFORMIN 1000 MG tablet  Commonly known as: GLUCOPHAGE  Take 1 tablet by mouth 2 times daily (with meals)     MULTIVITAMIN ADULT PO               Where to Get Your Medications        These medications were sent to [address], [address] [phone] [place] [facility] [phone]  [address] [address], [address] [address]      Phone: [phone]   HYDROcodone-acetaminophen 5-325 MG per tablet           DISCONTINUED MEDICATIONS:  Discharge Medication List as of 10/10/2023  3:26 PM          I am the Primary Clinician of Record. Philip Nieves MD (electronically signed)      (Please note that parts of this dictation were completed with voice recognition software. Quite often unanticipated grammatical, syntax, homophones, and other interpretive errors are inadvertently transcribed by the computer software. Please disregards these errors.  Please excuse any errors that have escaped final proofreading.)         Philip Nieves MD  10/11/23 7319

## 2023-10-23 ENCOUNTER — HOSPITAL ENCOUNTER (OUTPATIENT)
Facility: HOSPITAL | Age: 68
Discharge: HOME | End: 2023-10-26
Payer: MEDICARE

## 2023-10-23 DIAGNOSIS — S52.531A CLOSED COLLES' FRACTURE OF RIGHT RADIUS, INITIAL ENCOUNTER: ICD-10-CM

## 2023-10-23 PROCEDURE — 73100 X-RAY EXAM OF WRIST: CPT

## 2023-11-06 ENCOUNTER — HOSPITAL ENCOUNTER (OUTPATIENT)
Facility: HOSPITAL | Age: 68
Discharge: HOME | End: 2023-11-09
Payer: MEDICARE

## 2023-11-06 ENCOUNTER — TRANSCRIBE ORDERS (OUTPATIENT)
Facility: HOSPITAL | Age: 68
End: 2023-11-06

## 2023-11-06 DIAGNOSIS — S52.531A CLOSED COLLES' FRACTURE OF RIGHT RADIUS, INITIAL ENCOUNTER: ICD-10-CM

## 2023-11-06 DIAGNOSIS — S52.531A CLOSED COLLES' FRACTURE OF RIGHT RADIUS, INITIAL ENCOUNTER: Primary | ICD-10-CM

## 2023-11-06 PROCEDURE — 73110 X-RAY EXAM OF WRIST: CPT

## 2024-06-28 RX ORDER — ATORVASTATIN CALCIUM 80 MG/1
80 TABLET, FILM COATED ORAL DAILY
Qty: 90 TABLET | Refills: 0 | Status: SHIPPED | OUTPATIENT
Start: 2024-06-28

## 2024-06-28 RX ORDER — GLIMEPIRIDE 4 MG/1
4 TABLET ORAL DAILY
Qty: 90 TABLET | Refills: 0 | Status: SHIPPED | OUTPATIENT
Start: 2024-06-28

## 2024-06-28 RX ORDER — FLUOXETINE 20 MG/1
20 TABLET, FILM COATED ORAL DAILY
Qty: 90 TABLET | Refills: 0 | Status: SHIPPED | OUTPATIENT
Start: 2024-06-28

## 2024-10-03 RX ORDER — GLIMEPIRIDE 4 MG/1
4 TABLET ORAL DAILY
Qty: 90 TABLET | Refills: 0 | OUTPATIENT
Start: 2024-10-03

## 2024-10-03 RX ORDER — FLUOXETINE 20 MG/1
20 TABLET, FILM COATED ORAL DAILY
Qty: 90 TABLET | Refills: 0 | OUTPATIENT
Start: 2024-10-03

## 2024-10-03 RX ORDER — ATORVASTATIN CALCIUM 80 MG/1
80 TABLET, FILM COATED ORAL DAILY
Qty: 90 TABLET | Refills: 0 | OUTPATIENT
Start: 2024-10-03

## 2024-10-09 NOTE — TELEPHONE ENCOUNTER
Medication Refill Request    Sandrita LAGUNAS Trenton is requesting a refill of the following medication(s):   metFORMIN (GLUCOPHAGE) 1000 MG tablet   Please send refill to:     Sandrita states she took her las tone this morning . She has appt 10-15. Can she have enough to last until appt.    [facility] [address] [place] [address] - [address] [facility] [phone] - F [phone]  [address] [facility] [address]  Phone: [phone] Fax: [phone]

## 2024-10-12 SDOH — ECONOMIC STABILITY: FOOD INSECURITY: WITHIN THE PAST 12 MONTHS, YOU WORRIED THAT YOUR FOOD WOULD RUN OUT BEFORE YOU GOT MONEY TO BUY MORE.: SOMETIMES TRUE

## 2024-10-12 SDOH — ECONOMIC STABILITY: FOOD INSECURITY: WITHIN THE PAST 12 MONTHS, THE FOOD YOU BOUGHT JUST DIDN'T LAST AND YOU DIDN'T HAVE MONEY TO GET MORE.: SOMETIMES TRUE

## 2024-10-12 SDOH — ECONOMIC STABILITY: INCOME INSECURITY: HOW HARD IS IT FOR YOU TO PAY FOR THE VERY BASICS LIKE FOOD, HOUSING, MEDICAL CARE, AND HEATING?: SOMEWHAT HARD

## 2024-10-15 ENCOUNTER — OFFICE VISIT (OUTPATIENT)
Age: 69
End: 2024-10-15
Payer: MEDICARE

## 2024-10-15 VITALS
WEIGHT: 245 LBS | SYSTOLIC BLOOD PRESSURE: 120 MMHG | HEIGHT: 65 IN | RESPIRATION RATE: 10 BRPM | OXYGEN SATURATION: 96 % | TEMPERATURE: 97.7 F | HEART RATE: 76 BPM | BODY MASS INDEX: 40.82 KG/M2 | DIASTOLIC BLOOD PRESSURE: 80 MMHG

## 2024-10-15 DIAGNOSIS — Z91.81 AT HIGH RISK FOR FALLS: ICD-10-CM

## 2024-10-15 DIAGNOSIS — H93.13 TINNITUS OF BOTH EARS: ICD-10-CM

## 2024-10-15 DIAGNOSIS — D47.3 ESSENTIAL (HEMORRHAGIC) THROMBOCYTHEMIA (HCC): Primary | ICD-10-CM

## 2024-10-15 DIAGNOSIS — E11.21 TYPE 2 DIABETES MELLITUS WITH DIABETIC NEPHROPATHY, WITHOUT LONG-TERM CURRENT USE OF INSULIN (HCC): ICD-10-CM

## 2024-10-15 DIAGNOSIS — C50.111 MALIGNANT NEOPLASM OF CENTRAL PORTION OF RIGHT FEMALE BREAST, UNSPECIFIED ESTROGEN RECEPTOR STATUS (HCC): ICD-10-CM

## 2024-10-15 DIAGNOSIS — Z00.00 ENCOUNTER FOR MEDICARE ANNUAL WELLNESS EXAM: ICD-10-CM

## 2024-10-15 DIAGNOSIS — Z23 NEEDS FLU SHOT: ICD-10-CM

## 2024-10-15 DIAGNOSIS — E11.9 COMPREHENSIVE DIABETIC FOOT EXAMINATION, TYPE 2 DM, ENCOUNTER FOR (HCC): ICD-10-CM

## 2024-10-15 DIAGNOSIS — Z87.891 PERSONAL HISTORY OF TOBACCO USE: ICD-10-CM

## 2024-10-15 LAB
ALBUMIN, URINE, POC: 30 MG/L
CREATININE, URINE, POC: 50 MG/DL
MICROALB/CREAT RATIO, POC: ABNORMAL MG/G

## 2024-10-15 PROCEDURE — 82044 UR ALBUMIN SEMIQUANTITATIVE: CPT | Performed by: NURSE PRACTITIONER

## 2024-10-15 RX ORDER — ATORVASTATIN CALCIUM 80 MG/1
80 TABLET, FILM COATED ORAL DAILY
Qty: 90 TABLET | Refills: 1 | Status: SHIPPED | OUTPATIENT
Start: 2024-10-15

## 2024-10-15 RX ORDER — METFORMIN HCL 500 MG
1000 TABLET, EXTENDED RELEASE 24 HR ORAL
Qty: 60 TABLET | Refills: 5 | Status: SHIPPED | OUTPATIENT
Start: 2024-10-15

## 2024-10-15 RX ORDER — FLUOXETINE 20 MG/1
20 TABLET, FILM COATED ORAL DAILY
Qty: 90 TABLET | Refills: 1 | Status: SHIPPED | OUTPATIENT
Start: 2024-10-15

## 2024-10-15 RX ORDER — GLIMEPIRIDE 4 MG/1
4 TABLET ORAL DAILY
Qty: 90 TABLET | Refills: 1 | Status: SHIPPED | OUTPATIENT
Start: 2024-10-15

## 2024-10-15 SDOH — HEALTH STABILITY: PHYSICAL HEALTH: ON AVERAGE, HOW MANY MINUTES DO YOU ENGAGE IN EXERCISE AT THIS LEVEL?: 150+ MIN

## 2024-10-15 SDOH — HEALTH STABILITY: PHYSICAL HEALTH: ON AVERAGE, HOW MANY DAYS PER WEEK DO YOU ENGAGE IN MODERATE TO STRENUOUS EXERCISE (LIKE A BRISK WALK)?: 2 DAYS

## 2024-10-15 SDOH — ECONOMIC STABILITY: FOOD INSECURITY: WITHIN THE PAST 12 MONTHS, YOU WORRIED THAT YOUR FOOD WOULD RUN OUT BEFORE YOU GOT MONEY TO BUY MORE.: NEVER TRUE

## 2024-10-15 SDOH — ECONOMIC STABILITY: FOOD INSECURITY: WITHIN THE PAST 12 MONTHS, THE FOOD YOU BOUGHT JUST DIDN'T LAST AND YOU DIDN'T HAVE MONEY TO GET MORE.: NEVER TRUE

## 2024-10-15 SDOH — ECONOMIC STABILITY: INCOME INSECURITY: HOW HARD IS IT FOR YOU TO PAY FOR THE VERY BASICS LIKE FOOD, HOUSING, MEDICAL CARE, AND HEATING?: SOMEWHAT HARD

## 2024-10-15 ASSESSMENT — PATIENT HEALTH QUESTIONNAIRE - PHQ9
SUM OF ALL RESPONSES TO PHQ QUESTIONS 1-9: 0
SUM OF ALL RESPONSES TO PHQ9 QUESTIONS 1 & 2: 0
SUM OF ALL RESPONSES TO PHQ QUESTIONS 1-9: 0
1. LITTLE INTEREST OR PLEASURE IN DOING THINGS: NOT AT ALL
SUM OF ALL RESPONSES TO PHQ QUESTIONS 1-9: 0
SUM OF ALL RESPONSES TO PHQ QUESTIONS 1-9: 0
2. FEELING DOWN, DEPRESSED OR HOPELESS: NOT AT ALL

## 2024-10-15 ASSESSMENT — SOCIAL DETERMINANTS OF HEALTH (SDOH)
HOW OFTEN DO YOU ATTEND CHURCH OR RELIGIOUS SERVICES?: MORE THAN 4 TIMES PER YEAR
DO YOU BELONG TO ANY CLUBS OR ORGANIZATIONS SUCH AS CHURCH GROUPS UNIONS, FRATERNAL OR ATHLETIC GROUPS, OR SCHOOL GROUPS?: NO
HOW OFTEN DO YOU ATTENT MEETINGS OF THE CLUB OR ORGANIZATION YOU BELONG TO?: NEVER
IN A TYPICAL WEEK, HOW MANY TIMES DO YOU TALK ON THE PHONE WITH FAMILY, FRIENDS, OR NEIGHBORS?: TWICE A WEEK
HOW OFTEN DO YOU GET TOGETHER WITH FRIENDS OR RELATIVES?: TWICE A WEEK

## 2024-10-15 NOTE — ASSESSMENT & PLAN NOTE
Chronic, at goal (stable), continue current treatment plan changing to XR metformin to decrease diarrhea

## 2024-10-15 NOTE — PROGRESS NOTES
Patient here for labs  drawn from  right antecubital without pain or bruising.   
Patient was administered influenza vaccine via IM injection.  Patient tolerated medication without noted side effects.  Medication information reviewed with patient, patient states understanding. Patient to resume routine medications at home.  Patient given copy of AVS with medication information and instructions for home.  AVS reviewed with patient and patient states understanding.       
\"Have you been to the ER, urgent care clinic since your last visit?  Hospitalized since your last visit?\"    NO    “Have you seen or consulted any other health care providers outside of [facility] since your last visit?”    NO    Have you had a mammogram?”       Date of last Mammogram: 1/6/2023             Click Here for Release of Records Request      Chief Complaint   Patient presents with    Medicare AWV         Vitals:    10/15/24 1134   BP: 120/80   Pulse: 76   Resp: 10   Temp: 97.7 °F (36.5 °C)   SpO2: 96%     
 Provider, MD Juanis       CareTeam (Including outside providers/suppliers regularly involved in providing care):   Patient Care Team:  Alida Vieyra APRN - OLGA as PCP - General  Alida Vieyra APRN - NP as PCP - Empaneled Provider      Reviewed and updated this visit:  Tobacco  Allergies  Meds  Problems  Med Hx  Surg Hx  Soc Hx  Fam Hx

## 2024-10-16 LAB
ALBUMIN SERPL-MCNC: 3.6 G/DL (ref 3.5–5)
ALBUMIN/GLOB SERPL: 1.1 (ref 1.1–2.2)
ALP SERPL-CCNC: 109 U/L (ref 45–117)
ALT SERPL-CCNC: 25 U/L (ref 12–78)
ANION GAP SERPL CALC-SCNC: 4 MMOL/L (ref 2–12)
AST SERPL-CCNC: 12 U/L (ref 15–37)
BASOPHILS # BLD: 0.1 K/UL (ref 0–0.1)
BASOPHILS NFR BLD: 1 % (ref 0–1)
BILIRUB SERPL-MCNC: 0.2 MG/DL (ref 0.2–1)
BUN SERPL-MCNC: 14 MG/DL (ref 6–20)
BUN/CREAT SERPL: 21 (ref 12–20)
CALCIUM SERPL-MCNC: 9.1 MG/DL (ref 8.5–10.1)
CHLORIDE SERPL-SCNC: 106 MMOL/L (ref 97–108)
CHOLEST SERPL-MCNC: 187 MG/DL
CO2 SERPL-SCNC: 27 MMOL/L (ref 21–32)
CREAT SERPL-MCNC: 0.66 MG/DL (ref 0.55–1.02)
DIFFERENTIAL METHOD BLD: ABNORMAL
EOSINOPHIL # BLD: 0.3 K/UL (ref 0–0.4)
EOSINOPHIL NFR BLD: 3 % (ref 0–7)
ERYTHROCYTE [DISTWIDTH] IN BLOOD BY AUTOMATED COUNT: 13.5 % (ref 11.5–14.5)
EST. AVERAGE GLUCOSE BLD GHB EST-MCNC: 154 MG/DL
GLOBULIN SER CALC-MCNC: 3.2 G/DL (ref 2–4)
GLUCOSE SERPL-MCNC: 108 MG/DL (ref 65–100)
HBA1C MFR BLD: 7 % (ref 4–5.6)
HCT VFR BLD AUTO: 39.8 % (ref 35–47)
HDLC SERPL-MCNC: 53 MG/DL
HDLC SERPL: 3.5 (ref 0–5)
HGB BLD-MCNC: 12.1 G/DL (ref 11.5–16)
IMM GRANULOCYTES # BLD AUTO: 0.1 K/UL (ref 0–0.04)
IMM GRANULOCYTES NFR BLD AUTO: 1 % (ref 0–0.5)
LDLC SERPL CALC-MCNC: 72.8 MG/DL (ref 0–100)
LYMPHOCYTES # BLD: 3.1 K/UL (ref 0.8–3.5)
LYMPHOCYTES NFR BLD: 33 % (ref 12–49)
MCH RBC QN AUTO: 27.7 PG (ref 26–34)
MCHC RBC AUTO-ENTMCNC: 30.4 G/DL (ref 30–36.5)
MCV RBC AUTO: 91.1 FL (ref 80–99)
MONOCYTES # BLD: 0.7 K/UL (ref 0–1)
MONOCYTES NFR BLD: 8 % (ref 5–13)
NEUTS SEG # BLD: 5.2 K/UL (ref 1.8–8)
NEUTS SEG NFR BLD: 54 % (ref 32–75)
NRBC # BLD: 0 K/UL (ref 0–0.01)
NRBC BLD-RTO: 0 PER 100 WBC
PLATELET # BLD AUTO: 425 K/UL (ref 150–400)
PMV BLD AUTO: 9.7 FL (ref 8.9–12.9)
POTASSIUM SERPL-SCNC: 4.1 MMOL/L (ref 3.5–5.1)
PROT SERPL-MCNC: 6.8 G/DL (ref 6.4–8.2)
RBC # BLD AUTO: 4.37 M/UL (ref 3.8–5.2)
SODIUM SERPL-SCNC: 137 MMOL/L (ref 136–145)
TRIGL SERPL-MCNC: 306 MG/DL
VLDLC SERPL CALC-MCNC: 61.2 MG/DL
WBC # BLD AUTO: 9.3 K/UL (ref 3.6–11)

## 2024-12-29 NOTE — PROGRESS NOTES
Baljinder Ayala is a 59 y.o. female denies complaints. Last Mammo was 12/7/18. Last Dexa was 11/22/17. Last Prolia injection was 12/10/18. Key Oncology Meds   
    
  
 exemestane (AROMASIN) 25 mg tablet  (Taking) Take 1 Tab by mouth daily. There are no Wet Read(s) to document.

## 2025-04-09 RX ORDER — METFORMIN HYDROCHLORIDE 500 MG/1
TABLET, EXTENDED RELEASE ORAL
Qty: 60 TABLET | Refills: 0 | Status: SHIPPED | OUTPATIENT
Start: 2025-04-09

## 2025-04-10 RX ORDER — ATORVASTATIN CALCIUM 80 MG/1
80 TABLET, FILM COATED ORAL DAILY
Qty: 90 TABLET | Refills: 0 | Status: SHIPPED | OUTPATIENT
Start: 2025-04-10

## 2025-04-10 RX ORDER — GLIMEPIRIDE 4 MG/1
4 TABLET ORAL DAILY
Qty: 90 TABLET | Refills: 0 | Status: SHIPPED | OUTPATIENT
Start: 2025-04-10

## 2025-05-08 RX ORDER — FLUOXETINE 20 MG/1
20 TABLET, FILM COATED ORAL DAILY
Qty: 90 TABLET | Refills: 1 | Status: SHIPPED | OUTPATIENT
Start: 2025-05-08

## 2025-05-12 RX ORDER — METFORMIN HYDROCHLORIDE 500 MG/1
TABLET, EXTENDED RELEASE ORAL
Qty: 60 TABLET | Refills: 0 | Status: SHIPPED | OUTPATIENT
Start: 2025-05-12 | End: 2025-05-15 | Stop reason: DRUGHIGH

## 2025-05-12 SDOH — ECONOMIC STABILITY: INCOME INSECURITY: IN THE LAST 12 MONTHS, WAS THERE A TIME WHEN YOU WERE NOT ABLE TO PAY THE MORTGAGE OR RENT ON TIME?: YES

## 2025-05-12 SDOH — ECONOMIC STABILITY: FOOD INSECURITY: WITHIN THE PAST 12 MONTHS, YOU WORRIED THAT YOUR FOOD WOULD RUN OUT BEFORE YOU GOT MONEY TO BUY MORE.: NEVER TRUE

## 2025-05-12 SDOH — ECONOMIC STABILITY: FOOD INSECURITY: WITHIN THE PAST 12 MONTHS, THE FOOD YOU BOUGHT JUST DIDN'T LAST AND YOU DIDN'T HAVE MONEY TO GET MORE.: NEVER TRUE

## 2025-05-15 ENCOUNTER — OFFICE VISIT (OUTPATIENT)
Age: 70
End: 2025-05-15
Payer: MEDICARE

## 2025-05-15 VITALS
BODY MASS INDEX: 40.65 KG/M2 | TEMPERATURE: 97.5 F | WEIGHT: 244 LBS | SYSTOLIC BLOOD PRESSURE: 134 MMHG | RESPIRATION RATE: 20 BRPM | DIASTOLIC BLOOD PRESSURE: 80 MMHG | OXYGEN SATURATION: 98 % | HEIGHT: 65 IN

## 2025-05-15 DIAGNOSIS — E11.21 TYPE 2 DIABETES MELLITUS WITH DIABETIC NEPHROPATHY, WITHOUT LONG-TERM CURRENT USE OF INSULIN (HCC): Primary | ICD-10-CM

## 2025-05-15 PROBLEM — D47.3 ESSENTIAL (HEMORRHAGIC) THROMBOCYTHEMIA (HCC): Status: RESOLVED | Noted: 2018-12-10 | Resolved: 2025-05-15

## 2025-05-15 LAB
ANION GAP SERPL CALC-SCNC: 9 MMOL/L (ref 2–12)
BUN SERPL-MCNC: 16 MG/DL (ref 6–20)
BUN/CREAT SERPL: 25 (ref 12–20)
CALCIUM SERPL-MCNC: 9.5 MG/DL (ref 8.5–10.1)
CHLORIDE SERPL-SCNC: 104 MMOL/L (ref 97–108)
CO2 SERPL-SCNC: 24 MMOL/L (ref 21–32)
CREAT SERPL-MCNC: 0.65 MG/DL (ref 0.55–1.02)
CREAT UR-MCNC: 54.6 MG/DL
GLUCOSE SERPL-MCNC: 226 MG/DL (ref 65–100)
MICROALBUMIN UR-MCNC: 2.33 MG/DL
MICROALBUMIN/CREAT UR-RTO: 43 MG/G (ref 0–30)
POTASSIUM SERPL-SCNC: 3.9 MMOL/L (ref 3.5–5.1)
SODIUM SERPL-SCNC: 137 MMOL/L (ref 136–145)

## 2025-05-15 PROCEDURE — 1160F RVW MEDS BY RX/DR IN RCRD: CPT | Performed by: NURSE PRACTITIONER

## 2025-05-15 PROCEDURE — 1125F AMNT PAIN NOTED PAIN PRSNT: CPT | Performed by: NURSE PRACTITIONER

## 2025-05-15 PROCEDURE — 99213 OFFICE O/P EST LOW 20 MIN: CPT | Performed by: NURSE PRACTITIONER

## 2025-05-15 PROCEDURE — 36415 COLL VENOUS BLD VENIPUNCTURE: CPT | Performed by: NURSE PRACTITIONER

## 2025-05-15 PROCEDURE — 1123F ACP DISCUSS/DSCN MKR DOCD: CPT | Performed by: NURSE PRACTITIONER

## 2025-05-15 PROCEDURE — 1159F MED LIST DOCD IN RCRD: CPT | Performed by: NURSE PRACTITIONER

## 2025-05-15 RX ORDER — ATORVASTATIN CALCIUM 80 MG/1
80 TABLET, FILM COATED ORAL DAILY
Qty: 90 TABLET | Refills: 1 | Status: SHIPPED | OUTPATIENT
Start: 2025-05-15

## 2025-05-15 RX ORDER — METFORMIN HYDROCHLORIDE 500 MG/1
1000 TABLET, EXTENDED RELEASE ORAL 2 TIMES DAILY
Qty: 360 TABLET | Refills: 1 | Status: SHIPPED | OUTPATIENT
Start: 2025-05-15

## 2025-05-15 RX ORDER — GLIMEPIRIDE 4 MG/1
4 TABLET ORAL DAILY
Qty: 90 TABLET | Refills: 1 | Status: SHIPPED | OUTPATIENT
Start: 2025-05-15

## 2025-05-15 ASSESSMENT — PATIENT HEALTH QUESTIONNAIRE - PHQ9
1. LITTLE INTEREST OR PLEASURE IN DOING THINGS: NOT AT ALL
SUM OF ALL RESPONSES TO PHQ QUESTIONS 1-9: 0
2. FEELING DOWN, DEPRESSED OR HOPELESS: NOT AT ALL

## 2025-05-15 NOTE — PROGRESS NOTES
Subjective:     Sandrita Chowdhury is a 70 y.o. female who presents today with the following:  Chief Complaint   Patient presents with    Diabetes       History of Present Illness  The patient is a 70-year-old female who presents for a recheck of her kidneys, medication renewal, and to address some questions.    She reports that her metformin dosage was reduced from 2000 mg to 1000 mg daily due to severe diarrhea, which has since resolved. She has been on this regimen for the past 6 months, taking one dose in the morning and another in the afternoon. However, she has observed a slight increase in her blood glucose levels. She also mentions that her last medication refill was for a 90-day supply, but she is uncertain if the current refill is for the same duration or a 30-day supply. She recalls a previous instance where she was unable to obtain her medications until after her appointment, resulting in a 2-week period without certain medications. She plans to contact [facility] to clarify the duration of her current refill. She reports no issues with thrombocytopenia. She is currently on metformin 1000 mg daily.    She reports no hematuria. She has been consuming cranberry juice in large quantities. She notes that her urine is dark and strong-smelling in the morning, but becomes clear and odorless as the day progresses. She has recently started using pads due to occasional urinary incontinence, particularly when standing up from a seated position.    She is currently on fluoxetine and does not want to go without it. She is not taking any sleeping pills.    Patient Active Problem List   Diagnosis    Post-menopausal osteoporosis    Obesity, unspecified    Fatigue due to treatment    Use of exemestane (Aromasin)    Osteopenia    Type 2 diabetes with nephropathy (HCC)    Mixed hyperlipidemia    Obesity, morbid (HCC)    Use of letrozole (Femara)    Wrist pain, left    Hyperglobulinemia    Osteoarthritis    Type 2 diabetes mellitus

## 2025-05-15 NOTE — PROGRESS NOTES
\"Have you been to the ER, urgent care clinic since your last visit?  Hospitalized since your last visit?\"    NO    “Have you seen or consulted any other health care providers outside of [facility] since your last visit?”    NO    Have you had a mammogram?”       Date of last Mammogram: 1/6/2023             Click Here for Release of Records Request      Chief Complaint   Patient presents with    Diabetes         Vitals:    05/15/25 1547   BP: 134/80   Resp: 20   Temp: 97.5 °F (36.4 °C)   SpO2: 98%

## 2025-05-15 NOTE — ASSESSMENT & PLAN NOTE
Chronic, at goal (stable), continue current treatment plan    Orders:    Basic Metabolic Panel; Future    COLLECTION VENOUS BLOOD,VENIPUNCTURE; Future    Albumin/Creatinine Ratio, Urine; Future

## 2025-05-16 ENCOUNTER — RESULTS FOLLOW-UP (OUTPATIENT)
Age: 70
End: 2025-05-16

## 2025-07-30 ENCOUNTER — TELEPHONE (OUTPATIENT)
Dept: PHARMACY | Facility: CLINIC | Age: 70
End: 2025-07-30

## 2025-07-30 NOTE — TELEPHONE ENCOUNTER
[facility] CLINICAL PHARMACY: ADHERENCE REVIEW  Identified care gap per [employer] fills with [facility]: Diabetes and Statin adherence    Medicare Advantage - MRMA  ACO  Per insurer report, LIS-0 - co-pays are based on tiers and patient is subject to coverage gap.    ASSESSMENT    DIABETES ADHERENCE    Insurance Records claims through 25 (Prior Year PDC = not reported; YTD PDC = 100%; Potential Fail Date: 10.05.25):   GLIMEPIRIDE TAB 4 MG last filled on 04.10.25 for 90 day supply. Next refill due: 25    Prescribed si tablet/capsule daily    Per Reconcile Dispense History and Insurer Portal: last filled on 04.10.25 for 90 day supply.     Per [facility]: 2 refills remaining.    Insurance Records claims through 25 (Prior Year PDC = not reported; YTD PDC = 100%; Potential Fail Date: 10.05.25):   METFORMIN  MG ER last filled on 05.15.25 for 90 day supply. Next refill due: 25    Prescribed sig: Take 2 tablets by mouth in the morning and at bedtime     Per Reconcile Dispense History and Insurer Portal: last filled on 05.15.25 for 90 day supply.     Per [facility]: 1 refill remaining.    Lab Results   Component Value Date    LABA1C 7.0 (H) 10/15/2024    LABA1C 7.1 (H) 2023    LABA1C 7.1 (H) 2022     STATIN ADHERENCE    Insurance Records claims through 25 (Prior Year PDC = not reported; YTD PDC = FIRST FILL; Potential Fail Date: 25):   ATORVASTATIN TAB 80 MG last filled on 04.10.25 for 90 day supply. Next refill due: 25    Prescribed si tablet/capsule daily    Per Reconcile Dispense History and Insurer Portal: last filled on 04.10.25 for 90 day supply.     Per [facility]: 2 refills remaining.    Lab Results   Component Value Date    CHOL 187 10/15/2024    TRIG 306 (H) 10/15/2024    HDL 53 10/15/2024     Lab Results   Component Value Date    LDL 72.8 10/15/2024      ALT   Date Value Ref Range Status   10/15/2024 25 12 - 78 U/L

## 2025-08-01 NOTE — TELEPHONE ENCOUNTER
Pr pt MyChart.    will refill asajose f Rees CPhT  Population Health    [facility]   [phone] option 1

## (undated) DEVICE — ENDO CARRY-ON PROCEDURE KIT INCLUDES ENZYMATIC SPONGE, GAUZE, BIOHAZARD LABEL, TRAY, LUBRICANT, DIRTY SCOPE LABEL, WATER LABEL, TRAY, DRAWSTRING PAD, AND DEFENDO 4-PIECE KIT.: Brand: ENDO CARRY-ON PROCEDURE KIT

## (undated) DEVICE — SYR 20ML LL STRL LF --

## (undated) DEVICE — FCPS BX HOT RJ4 2.2MMX240CM -- RADIAL JAW 4 BX/40

## (undated) DEVICE — SOLUTION IRRIG 1000ML STRL H2O USP PLAS POUR BTL

## (undated) DEVICE — BLUNT CANNULA: Brand: MONOJECT

## (undated) DEVICE — FORCEPS ENDOSCP BX L230CM DIA2.8MM ALGTR CUP SPEC RETRV GI

## (undated) DEVICE — SNARE ENDOSCP L240CM SHTH DIA2.4MM LOOP W20MM MIN WRK CHN